# Patient Record
Sex: FEMALE | Race: WHITE | NOT HISPANIC OR LATINO
[De-identification: names, ages, dates, MRNs, and addresses within clinical notes are randomized per-mention and may not be internally consistent; named-entity substitution may affect disease eponyms.]

---

## 2017-01-18 ENCOUNTER — APPOINTMENT (OUTPATIENT)
Dept: OBGYN | Facility: CLINIC | Age: 67
End: 2017-01-18

## 2017-01-18 VITALS
WEIGHT: 131 LBS | SYSTOLIC BLOOD PRESSURE: 112 MMHG | DIASTOLIC BLOOD PRESSURE: 68 MMHG | HEIGHT: 63 IN | BODY MASS INDEX: 23.21 KG/M2

## 2017-01-18 DIAGNOSIS — Z80.8 FAMILY HISTORY OF MALIGNANT NEOPLASM OF OTHER ORGANS OR SYSTEMS: ICD-10-CM

## 2017-01-18 LAB
BILIRUB UR QL STRIP: NORMAL
COLLECTION METHOD: NORMAL
GLUCOSE UR-MCNC: NORMAL
HCG UR QL: 0.2 EU/DL
HGB UR QL STRIP.AUTO: NORMAL
KETONES UR-MCNC: NORMAL
LEUKOCYTE ESTERASE UR QL STRIP: NORMAL
NITRITE UR QL STRIP: NORMAL
PH UR STRIP: 6
PROT UR STRIP-MCNC: NORMAL
SP GR UR STRIP: 1.02

## 2017-02-02 ENCOUNTER — RX RENEWAL (OUTPATIENT)
Age: 67
End: 2017-02-02

## 2017-02-02 RX ORDER — PAROXETINE 7.5 MG/1
7.5 CAPSULE ORAL
Qty: 90 | Refills: 3 | Status: DISCONTINUED | COMMUNITY
Start: 2017-01-18 | End: 2017-02-02

## 2017-04-27 ENCOUNTER — APPOINTMENT (OUTPATIENT)
Dept: OBGYN | Facility: CLINIC | Age: 67
End: 2017-04-27

## 2017-04-27 VITALS
DIASTOLIC BLOOD PRESSURE: 72 MMHG | HEIGHT: 63 IN | WEIGHT: 137 LBS | BODY MASS INDEX: 24.27 KG/M2 | SYSTOLIC BLOOD PRESSURE: 121 MMHG

## 2017-04-27 DIAGNOSIS — F41.1 GENERALIZED ANXIETY DISORDER: ICD-10-CM

## 2017-04-27 DIAGNOSIS — G47.00 INSOMNIA, UNSPECIFIED: ICD-10-CM

## 2017-04-27 LAB
BILIRUB UR QL STRIP: NORMAL
COLLECTION METHOD: NORMAL
GLUCOSE UR-MCNC: NORMAL
HCG UR QL: 1 EU/DL
HGB UR QL STRIP.AUTO: NORMAL
KETONES UR-MCNC: ABNORMAL
LEUKOCYTE ESTERASE UR QL STRIP: NORMAL
NITRITE UR QL STRIP: NORMAL
PH UR STRIP: 7
PROT UR STRIP-MCNC: ABNORMAL
SP GR UR STRIP: 1.02

## 2017-04-27 RX ORDER — MELATONIN 3 MG
TABLET ORAL
Refills: 0 | Status: ACTIVE | COMMUNITY

## 2017-04-27 RX ORDER — PAROXETINE HYDROCHLORIDE 10 MG/1
10 TABLET, FILM COATED ORAL DAILY
Qty: 90 | Refills: 0 | Status: DISCONTINUED | COMMUNITY
Start: 2017-02-02 | End: 2017-04-27

## 2017-07-10 ENCOUNTER — OTHER (OUTPATIENT)
Age: 67
End: 2017-07-10

## 2017-07-10 ENCOUNTER — RX RENEWAL (OUTPATIENT)
Age: 67
End: 2017-07-10

## 2017-12-13 ENCOUNTER — APPOINTMENT (OUTPATIENT)
Dept: OBGYN | Facility: CLINIC | Age: 67
End: 2017-12-13
Payer: MEDICARE

## 2017-12-13 VITALS
HEIGHT: 63 IN | DIASTOLIC BLOOD PRESSURE: 80 MMHG | WEIGHT: 133.03 LBS | SYSTOLIC BLOOD PRESSURE: 122 MMHG | BODY MASS INDEX: 23.57 KG/M2

## 2017-12-13 DIAGNOSIS — R23.2 FLUSHING: ICD-10-CM

## 2017-12-13 LAB
BILIRUB UR QL STRIP: NORMAL
COLLECTION METHOD: NORMAL
GLUCOSE UR-MCNC: NORMAL
HCG UR QL: 0.2 EU/DL
HGB UR QL STRIP.AUTO: NORMAL
KETONES UR-MCNC: NORMAL
LEUKOCYTE ESTERASE UR QL STRIP: NORMAL
NITRITE UR QL STRIP: NORMAL
PH UR STRIP: 6.5
PROT UR STRIP-MCNC: NORMAL
SP GR UR STRIP: 1.02

## 2017-12-13 PROCEDURE — 99213 OFFICE O/P EST LOW 20 MIN: CPT

## 2017-12-13 PROCEDURE — 81003 URINALYSIS AUTO W/O SCOPE: CPT | Mod: QW

## 2018-02-27 ENCOUNTER — APPOINTMENT (OUTPATIENT)
Dept: OBGYN | Facility: CLINIC | Age: 68
End: 2018-02-27
Payer: MEDICARE

## 2018-02-27 VITALS
BODY MASS INDEX: 23.39 KG/M2 | SYSTOLIC BLOOD PRESSURE: 126 MMHG | WEIGHT: 132.03 LBS | DIASTOLIC BLOOD PRESSURE: 78 MMHG | HEIGHT: 63 IN

## 2018-02-27 PROCEDURE — 99214 OFFICE O/P EST MOD 30 MIN: CPT

## 2018-02-27 RX ORDER — PAROXETINE 7.5 MG/1
7.5 CAPSULE ORAL
Qty: 90 | Refills: 3 | Status: DISCONTINUED | COMMUNITY
Start: 2017-12-13 | End: 2018-02-27

## 2018-06-14 ENCOUNTER — APPOINTMENT (OUTPATIENT)
Dept: OBGYN | Facility: CLINIC | Age: 68
End: 2018-06-14
Payer: MEDICARE

## 2018-06-14 PROCEDURE — ZZZZZ: CPT

## 2018-06-15 LAB
APPEARANCE: CLEAR
BACTERIA: NEGATIVE
BILIRUBIN URINE: NEGATIVE
BLOOD URINE: NEGATIVE
COLOR: YELLOW
GLUCOSE QUALITATIVE U: NEGATIVE MG/DL
HYALINE CASTS: 3 /LPF
KETONES URINE: ABNORMAL
LEUKOCYTE ESTERASE URINE: ABNORMAL
MICROSCOPIC-UA: NORMAL
NITRITE URINE: NEGATIVE
PH URINE: 6.5
PROTEIN URINE: NEGATIVE MG/DL
RED BLOOD CELLS URINE: 11 /HPF
SPECIFIC GRAVITY URINE: 1.02
SQUAMOUS EPITHELIAL CELLS: 1 /HPF
UROBILINOGEN URINE: NEGATIVE MG/DL
WHITE BLOOD CELLS URINE: 43 /HPF

## 2018-06-18 ENCOUNTER — APPOINTMENT (OUTPATIENT)
Dept: OBGYN | Facility: CLINIC | Age: 68
End: 2018-06-18
Payer: MEDICARE

## 2018-06-18 ENCOUNTER — RESULT REVIEW (OUTPATIENT)
Age: 68
End: 2018-06-18

## 2018-06-18 ENCOUNTER — ASOB RESULT (OUTPATIENT)
Age: 68
End: 2018-06-18

## 2018-06-18 PROCEDURE — 76857 US EXAM PELVIC LIMITED: CPT

## 2018-06-18 PROCEDURE — 76830 TRANSVAGINAL US NON-OB: CPT

## 2018-06-19 ENCOUNTER — APPOINTMENT (OUTPATIENT)
Dept: OBGYN | Facility: CLINIC | Age: 68
End: 2018-06-19
Payer: MEDICARE

## 2018-06-19 PROCEDURE — ZZZZZ: CPT

## 2018-06-21 ENCOUNTER — MESSAGE (OUTPATIENT)
Age: 68
End: 2018-06-21

## 2018-06-21 LAB
BACTERIA UR CULT: NORMAL
CANCER AG125 SERPL-ACNC: 24 U/ML

## 2018-06-28 ENCOUNTER — APPOINTMENT (OUTPATIENT)
Dept: OBGYN | Facility: CLINIC | Age: 68
End: 2018-06-28
Payer: MEDICARE

## 2018-06-28 VITALS
WEIGHT: 132 LBS | HEIGHT: 63 IN | BODY MASS INDEX: 23.39 KG/M2 | SYSTOLIC BLOOD PRESSURE: 126 MMHG | DIASTOLIC BLOOD PRESSURE: 80 MMHG

## 2018-06-28 PROCEDURE — 99214 OFFICE O/P EST MOD 30 MIN: CPT | Mod: 25

## 2018-06-28 PROCEDURE — 58100 BIOPSY OF UTERUS LINING: CPT

## 2018-07-03 ENCOUNTER — RESULT REVIEW (OUTPATIENT)
Age: 68
End: 2018-07-03

## 2018-07-03 LAB — CORE LAB BIOPSY: NORMAL

## 2019-03-26 ENCOUNTER — APPOINTMENT (OUTPATIENT)
Dept: OBGYN | Facility: CLINIC | Age: 69
End: 2019-03-26
Payer: MEDICARE

## 2019-03-26 VITALS
DIASTOLIC BLOOD PRESSURE: 78 MMHG | BODY MASS INDEX: 22.68 KG/M2 | WEIGHT: 128 LBS | HEIGHT: 63 IN | SYSTOLIC BLOOD PRESSURE: 120 MMHG

## 2019-03-26 DIAGNOSIS — N95.1 MENOPAUSAL AND FEMALE CLIMACTERIC STATES: ICD-10-CM

## 2019-03-26 DIAGNOSIS — Z01.419 ENCOUNTER FOR GYNECOLOGICAL EXAMINATION (GENERAL) (ROUTINE) W/OUT ABNORMAL FINDINGS: ICD-10-CM

## 2019-03-26 LAB
BILIRUB UR QL STRIP: NORMAL
CLARITY UR: CLEAR
COLLECTION METHOD: NORMAL
GLUCOSE UR-MCNC: NORMAL
HCG UR QL: 0.2 EU/DL
HEMOCCULT SP1 STL QL: NEGATIVE
HGB UR QL STRIP.AUTO: NORMAL
KETONES UR-MCNC: NORMAL
LEUKOCYTE ESTERASE UR QL STRIP: NORMAL
NITRITE UR QL STRIP: NORMAL
PH UR STRIP: 7
PROT UR STRIP-MCNC: NORMAL
SP GR UR STRIP: 1.02

## 2019-03-26 PROCEDURE — 82270 OCCULT BLOOD FECES: CPT

## 2019-03-26 PROCEDURE — 81003 URINALYSIS AUTO W/O SCOPE: CPT | Mod: QW

## 2019-03-26 PROCEDURE — G0101: CPT

## 2019-03-26 RX ORDER — NITROFURANTOIN (MONOHYDRATE/MACROCRYSTALS) 25; 75 MG/1; MG/1
100 CAPSULE ORAL TWICE DAILY
Qty: 14 | Refills: 0 | Status: DISCONTINUED | COMMUNITY
Start: 2018-06-15 | End: 2019-03-26

## 2019-03-26 RX ORDER — CYCLOSPORINE 0.5 MG/ML
0.05 EMULSION OPHTHALMIC
Refills: 0 | Status: ACTIVE | COMMUNITY

## 2019-03-26 RX ORDER — PREDNISONE 50 MG/1
TABLET ORAL
Refills: 0 | Status: DISCONTINUED | COMMUNITY
End: 2019-03-26

## 2019-03-26 NOTE — HISTORY OF PRESENT ILLNESS
[1 Year Ago] : 1 year ago [Fair] : being in fair health [Healthy Diet] : a healthy diet [Weight Concerns] : weight concens [Last Mammogram ___] : Last Mammogram was [unfilled] [Last Bone Density ___] : Last bone density studies [unfilled] [Last Pap ___] : Last cervical pap smear was [unfilled] [Postmenopausal] : is postmenopausal [Hot Flashes] : hot flashes [Night Sweats] : night sweats [Definite:  ___ (Date)] : the last menstrual period was [unfilled] [Currently In Menopause] : currently in menopause [Experiencing Menopausal Sxs] : experiencing menopausal symptoms [Sexually Active] : is sexually active [Monogamous] : is monogamous [Male ___] : [unfilled] male [Last Colonoscopy ___] : Last colonoscopy [unfilled] [Regular Exercise] : not exercising regularly [Dyspareunia] : no dyspareunia [FreeTextEntry8] : STILL USING VAGINAL ESTROGEN AND VAGIFEM, WITH GOOD RESULTS

## 2019-03-26 NOTE — REVIEW OF SYSTEMS
[Feeling Tired] : feeling tired [Sleep Disturbances] : sleep disturbances [Anxiety] : anxiety [Hot Flashes] : hot flashes

## 2019-03-26 NOTE — PHYSICAL EXAM
[Awake] : awake [Alert] : alert [Soft] : soft [Soft, Nontender] : the abdomen was soft and nontender [No Mass] : no masses were palpated [None] : no CVA tenderness [Oriented x3] : oriented to person, place, and time [Vulvar Atrophy] : vulvar atrophy [Labia Majora] : labia major [Atrophy] : atrophy [Nl Sphincter Tone] : normal sphincter tone [External Hemorrhoid] : an external hemorrhoid [Normal] : uterus [Adnexa Absent] : was absent on the left [Acute Distress] : no acute distress [Mass] : no breast mass [Nipple Discharge] : no nipple discharge [Axillary LAD] : no axillary lymphadenopathy [Tender] : non tender [Distended] : not distended [H/Smegaly] : no hepatosplenomegaly [Depressed Mood] : not depressed [Flat Affect] : affect not flat [Occult Blood] : occult blood test from digital rectal exam was negative [FreeTextEntry7] : FOLLOWING OVARIAN CYSTS

## 2019-03-28 ENCOUNTER — APPOINTMENT (OUTPATIENT)
Dept: OBGYN | Facility: CLINIC | Age: 69
End: 2019-03-28
Payer: MEDICARE

## 2019-03-28 ENCOUNTER — ASOB RESULT (OUTPATIENT)
Age: 69
End: 2019-03-28

## 2019-03-28 PROCEDURE — 76857 US EXAM PELVIC LIMITED: CPT

## 2019-03-28 PROCEDURE — 76830 TRANSVAGINAL US NON-OB: CPT

## 2019-04-01 LAB — CYTOLOGY CVX/VAG DOC THIN PREP: NORMAL

## 2019-04-03 ENCOUNTER — MEDICATION RENEWAL (OUTPATIENT)
Age: 69
End: 2019-04-03

## 2019-05-30 ENCOUNTER — APPOINTMENT (OUTPATIENT)
Dept: OBGYN | Facility: CLINIC | Age: 69
End: 2019-05-30
Payer: MEDICARE

## 2019-05-30 ENCOUNTER — ASOB RESULT (OUTPATIENT)
Age: 69
End: 2019-05-30

## 2019-05-30 ENCOUNTER — TRANSCRIPTION ENCOUNTER (OUTPATIENT)
Age: 69
End: 2019-05-30

## 2019-05-30 PROCEDURE — 76857 US EXAM PELVIC LIMITED: CPT

## 2019-05-30 PROCEDURE — 76830 TRANSVAGINAL US NON-OB: CPT

## 2019-07-15 ENCOUNTER — RESULT REVIEW (OUTPATIENT)
Age: 69
End: 2019-07-15

## 2019-09-11 ENCOUNTER — APPOINTMENT (OUTPATIENT)
Dept: OBGYN | Facility: CLINIC | Age: 69
End: 2019-09-11
Payer: MEDICARE

## 2019-09-11 VITALS
SYSTOLIC BLOOD PRESSURE: 112 MMHG | HEIGHT: 63 IN | DIASTOLIC BLOOD PRESSURE: 78 MMHG | BODY MASS INDEX: 23.08 KG/M2 | WEIGHT: 130.25 LBS

## 2019-09-11 DIAGNOSIS — D28.0 BENIGN NEOPLASM OF VULVA: ICD-10-CM

## 2019-09-11 LAB
BILIRUB UR QL STRIP: ABNORMAL
CLARITY UR: CLEAR
COLLECTION METHOD: NORMAL
GLUCOSE UR-MCNC: NORMAL
HCG UR QL: 0.2 EU/DL
HGB UR QL STRIP.AUTO: NORMAL
KETONES UR-MCNC: NORMAL
LEUKOCYTE ESTERASE UR QL STRIP: ABNORMAL
NITRITE UR QL STRIP: NORMAL
PH UR STRIP: 6.5
PROT UR STRIP-MCNC: NORMAL
SP GR UR STRIP: 1.02

## 2019-09-11 PROCEDURE — 81003 URINALYSIS AUTO W/O SCOPE: CPT | Mod: QW

## 2019-09-11 PROCEDURE — 99213 OFFICE O/P EST LOW 20 MIN: CPT

## 2019-09-11 RX ORDER — PAROXETINE HYDROCHLORIDE 10 MG/1
10 TABLET, FILM COATED ORAL
Qty: 90 | Refills: 3 | Status: DISCONTINUED | COMMUNITY
Start: 2017-07-10 | End: 2019-09-11

## 2019-09-11 RX ORDER — RANITIDINE 300 MG/1
300 TABLET ORAL
Refills: 0 | Status: DISCONTINUED | COMMUNITY
End: 2019-09-11

## 2019-09-11 NOTE — CHIEF COMPLAINT
[Urgent Visit] : Urgent Visit [FreeTextEntry1] : APPROXIMATELY 1 MONTH AGO NOTICED LUMP AT [facility], VISIBLE WHEN RETRACTING LABIA.  NOT PAINFUL, NO PAIN WITH INTERCOURSE.  NO CHANGE IN VAGINAL DISCHARGE OR URINARY COMPLAINTS.\par \par GOING AWAY TO [facility] TO [place].

## 2019-09-11 NOTE — PHYSICAL EXAM
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

## 2019-10-28 ENCOUNTER — RESULT REVIEW (OUTPATIENT)
Age: 69
End: 2019-10-28

## 2019-12-09 ENCOUNTER — RESULT REVIEW (OUTPATIENT)
Age: 69
End: 2019-12-09

## 2020-01-08 ENCOUNTER — APPOINTMENT (OUTPATIENT)
Dept: OBGYN | Facility: CLINIC | Age: 70
End: 2020-01-08
Payer: MEDICARE

## 2020-01-08 VITALS
HEIGHT: 63 IN | BODY MASS INDEX: 23.04 KG/M2 | WEIGHT: 130 LBS | DIASTOLIC BLOOD PRESSURE: 70 MMHG | SYSTOLIC BLOOD PRESSURE: 112 MMHG

## 2020-01-08 DIAGNOSIS — K25.3 ACUTE GASTRIC ULCER W/OUT HEMORRHAGE OR PERFORATION: ICD-10-CM

## 2020-01-08 DIAGNOSIS — R35.0 FREQUENCY OF MICTURITION: ICD-10-CM

## 2020-01-08 DIAGNOSIS — N39.0 URINARY TRACT INFECTION, SITE NOT SPECIFIED: ICD-10-CM

## 2020-01-08 LAB
BILIRUB UR QL STRIP: NORMAL
COLLECTION METHOD: NORMAL
GLUCOSE UR-MCNC: NORMAL
HCG UR QL: 0.2 EU/DL
HGB UR QL STRIP.AUTO: ABNORMAL
KETONES UR-MCNC: NORMAL
LEUKOCYTE ESTERASE UR QL STRIP: ABNORMAL
NITRITE UR QL STRIP: NORMAL
PH UR STRIP: 5.5
PROT UR STRIP-MCNC: ABNORMAL
SP GR UR STRIP: 1.03

## 2020-01-08 PROCEDURE — 99213 OFFICE O/P EST LOW 20 MIN: CPT

## 2020-01-08 PROCEDURE — 81003 URINALYSIS AUTO W/O SCOPE: CPT | Mod: QW

## 2020-01-08 RX ORDER — BENZONATATE 100 MG/1
100 CAPSULE ORAL
Qty: 30 | Refills: 0 | Status: DISCONTINUED | COMMUNITY
Start: 2019-10-01

## 2020-01-08 RX ORDER — AZITHROMYCIN 250 MG/1
250 TABLET, FILM COATED ORAL
Qty: 6 | Refills: 0 | Status: DISCONTINUED | COMMUNITY
Start: 2019-10-05

## 2020-01-08 RX ORDER — ZALEPLON 10 MG/1
10 CAPSULE ORAL
Qty: 2 | Refills: 0 | Status: DISCONTINUED | COMMUNITY
Start: 2019-09-13

## 2020-01-08 RX ORDER — FLUTICASONE PROPIONATE 50 UG/1
50 SPRAY, METERED NASAL
Qty: 16 | Refills: 0 | Status: DISCONTINUED | COMMUNITY
Start: 2019-10-01

## 2020-01-08 NOTE — CHIEF COMPLAINT
[Urgent Visit] : Urgent Visit [FreeTextEntry1] : JUST BACK FROM 2 WEEK VACATION C/O SOME BLOOD WHEN WIPING, WITH URINATION, CLOUDY URINE, URINARY URGENCY.  SOME VAGINAL "IRRITATION" WITH POSSIBLE INCREASE IN WHITE, CLUMPY DISCHARGE.  "FLAKES" OF BLOOD LIKE HOT PEPPER FLAKES.  SMALL  BACK ACHE.  NO FEVER, NO CHANGE IN APPETITE OR GI COMPLAINTS.  HASN'T BEEN USING VAGIFEM PAST 2 WEEKS.\par \par LAST ENDOMETRIAL BIOPSY 6/2018, ATROPHIC. \par \par FATHER IN HOSPITAL NOW.

## 2020-01-08 NOTE — PHYSICAL EXAM
[Vulvar Atrophy] : vulvar atrophy [Normal] : uterus [Discharge] : a  ~M vaginal discharge was present [White] : white [Scant] : scant [Foul Smelling] : not foul smelling [Uterine Adnexae] : were not tender and not enlarged [Thin] : thin [No Bleeding] : there was no active vaginal bleeding [FreeTextEntry5] : NO BLOOD NOTED [de-identified] : NO CVAT OR ABDOMINAL TENDERNESS, GUARDING OR REBOUND

## 2020-01-13 LAB
APPEARANCE: ABNORMAL
BACTERIA UR CULT: ABNORMAL
BACTERIA: ABNORMAL
BILIRUBIN URINE: NEGATIVE
BLOOD URINE: ABNORMAL
COLOR: ABNORMAL
GLUCOSE QUALITATIVE U: NEGATIVE
KETONES URINE: NEGATIVE
LEUKOCYTE ESTERASE URINE: ABNORMAL
MICROSCOPIC-UA: NORMAL
NITRITE URINE: NEGATIVE
PH URINE: 8.5
PROTEIN URINE: ABNORMAL
RED BLOOD CELLS URINE: 95 /HPF
SPECIFIC GRAVITY URINE: 1.01
SQUAMOUS EPITHELIAL CELLS: 0 /HPF
TRIPLE PHOSPHATE CRYSTALS: ABNORMAL
UROBILINOGEN URINE: NORMAL
WHITE BLOOD CELLS URINE: 18 /HPF

## 2020-01-27 ENCOUNTER — OTHER (OUTPATIENT)
Age: 70
End: 2020-01-27

## 2020-01-27 ENCOUNTER — APPOINTMENT (OUTPATIENT)
Dept: OBGYN | Facility: CLINIC | Age: 70
End: 2020-01-27
Payer: MEDICARE

## 2020-01-27 PROCEDURE — ZZZZZ: CPT

## 2020-01-28 LAB
APPEARANCE: CLEAR
BACTERIA: NEGATIVE
BILIRUBIN URINE: NEGATIVE
BLOOD URINE: NEGATIVE
COLOR: NORMAL
GLUCOSE QUALITATIVE U: NEGATIVE
HYALINE CASTS: 1 /LPF
KETONES URINE: NEGATIVE
LEUKOCYTE ESTERASE URINE: NEGATIVE
MICROSCOPIC-UA: NORMAL
NITRITE URINE: NEGATIVE
PH URINE: 7
PROTEIN URINE: NEGATIVE
RED BLOOD CELLS URINE: 2 /HPF
SPECIFIC GRAVITY URINE: 1.02
SQUAMOUS EPITHELIAL CELLS: 1 /HPF
UROBILINOGEN URINE: NORMAL
WHITE BLOOD CELLS URINE: 0 /HPF

## 2020-01-29 LAB — BACTERIA UR CULT: NORMAL

## 2020-02-28 ENCOUNTER — APPOINTMENT (OUTPATIENT)
Dept: OBGYN | Facility: CLINIC | Age: 70
End: 2020-02-28

## 2020-02-28 VITALS
WEIGHT: 130 LBS | SYSTOLIC BLOOD PRESSURE: 124 MMHG | BODY MASS INDEX: 22.2 KG/M2 | HEIGHT: 64 IN | DIASTOLIC BLOOD PRESSURE: 72 MMHG

## 2020-02-28 DIAGNOSIS — R30.0 DYSURIA: ICD-10-CM

## 2020-02-28 LAB
BILIRUB UR QL STRIP: NORMAL
COLLECTION METHOD: NORMAL
GLUCOSE UR-MCNC: NORMAL
HCG UR QL: 0.2 EU/DL
HGB UR QL STRIP.AUTO: NORMAL
KETONES UR-MCNC: NORMAL
LEUKOCYTE ESTERASE UR QL STRIP: ABNORMAL
NITRITE UR QL STRIP: NORMAL
PH UR STRIP: 7
PROT UR STRIP-MCNC: NORMAL
SP GR UR STRIP: 1.02

## 2020-02-28 NOTE — HISTORY OF PRESENT ILLNESS
[___ Month(s) Ago] : [unfilled] month(s) ago [Last Pap ___] : Last cervical pap smear was [unfilled] [Last Mammogram ___] : Last Mammogram was [unfilled]

## 2020-03-02 LAB — BACTERIA UR CULT: ABNORMAL

## 2020-03-20 ENCOUNTER — NON-APPOINTMENT (OUTPATIENT)
Age: 70
End: 2020-03-20

## 2020-03-30 ENCOUNTER — APPOINTMENT (OUTPATIENT)
Dept: OBGYN | Facility: CLINIC | Age: 70
End: 2020-03-30

## 2020-03-30 ENCOUNTER — APPOINTMENT (OUTPATIENT)
Dept: OBGYN | Facility: CLINIC | Age: 70
End: 2020-03-30
Payer: MEDICARE

## 2020-03-30 VITALS — HEIGHT: 64 IN | SYSTOLIC BLOOD PRESSURE: 110 MMHG | DIASTOLIC BLOOD PRESSURE: 64 MMHG

## 2020-03-30 DIAGNOSIS — N60.11 DIFFUSE CYSTIC MASTOPATHY OF RIGHT BREAST: ICD-10-CM

## 2020-03-30 DIAGNOSIS — N60.12 DIFFUSE CYSTIC MASTOPATHY OF RIGHT BREAST: ICD-10-CM

## 2020-03-30 LAB
BILIRUB UR QL STRIP: NORMAL
CLARITY UR: CLEAR
COLLECTION METHOD: NORMAL
GLUCOSE UR-MCNC: NORMAL
HCG UR QL: 0.2 EU/DL
HGB UR QL STRIP.AUTO: NORMAL
KETONES UR-MCNC: NORMAL
LEUKOCYTE ESTERASE UR QL STRIP: ABNORMAL
NITRITE UR QL STRIP: NORMAL
PH UR STRIP: 7
PROT UR STRIP-MCNC: NORMAL
SP GR UR STRIP: 1.02

## 2020-03-30 PROCEDURE — 81003 URINALYSIS AUTO W/O SCOPE: CPT | Mod: QW

## 2020-03-30 PROCEDURE — 99214 OFFICE O/P EST MOD 30 MIN: CPT

## 2020-03-30 RX ORDER — CONJUGATED ESTROGENS 0.62 MG/G
0.62 CREAM VAGINAL
Qty: 1 | Refills: 3 | Status: COMPLETED | OUTPATIENT
Start: 2017-12-13 | End: 2020-03-30

## 2020-03-30 RX ORDER — TERCONAZOLE 8 MG/G
0.8 CREAM VAGINAL
Qty: 1 | Refills: 1 | Status: COMPLETED | COMMUNITY
Start: 2019-04-03 | End: 2020-03-30

## 2020-03-30 RX ORDER — ESTRADIOL 10 UG/1
10 INSERT VAGINAL
Qty: 36 | Refills: 3 | Status: COMPLETED | COMMUNITY
Start: 2017-12-13 | End: 2020-03-30

## 2020-03-30 RX ORDER — CONJUGATED ESTROGENS 0.62 MG/G
0.62 CREAM VAGINAL
Qty: 8 | Refills: 3 | Status: COMPLETED | OUTPATIENT
Start: 2019-03-26 | End: 2020-03-30

## 2020-03-30 RX ORDER — ESTRADIOL 10 UG/1
10 INSERT VAGINAL
Qty: 24 | Refills: 0 | Status: COMPLETED | OUTPATIENT
Start: 2017-01-18 | End: 2020-03-30

## 2020-03-30 NOTE — REVIEW OF SYSTEMS
[Bloating] : bloating [Frequency] : frequency [Urgency] : urgency [Arthralgias] : arthralgias [Joint Pain] : joint pain [Hot Flashes] : hot flashes [Muscle Weakness] : muscle weakness [Nl] : Integumentary [Dysuria] : no dysuria

## 2020-03-30 NOTE — PHYSICAL EXAM
[Awake] : awake [Alert] : alert [Axillary LAD] : axillary lymphadenopathy [Examination Of The Breasts] : a normal appearance [Axillary Lymph Nodes Enlarged On The Right] : enlarged node on the right [Acute Distress] : no acute distress [Mass] : no breast mass [Tender] : no tenderness [Nipple Discharge] : no nipple discharge [Axillary Lymph Nodes Enlarged On The Left] : no enlarged nodes on the left [Normal] : uterus [Labia Majora] : labia major [Labia Minora] : labia minora [Uterine Adnexae] : was normal on the left [Adnexa Absent] : was absent on the left [FreeTextEntry4] : appears well setrogenized

## 2020-03-30 NOTE — COUNSELING
[FreeTextEntry2] : Discussed with pt bladder irritants, and suggested she increase water, continue cranberry tabs,  decrease coffee to 10 ounces daily.  Also suggested she d/c any acidy food, any bladder irritants.  If culture neg, then fu with ruo for eval of urgency and frequency.  If positive pt must note if rx with antibiotics alter symptoms of urgency and frequency

## 2020-03-31 LAB
APPEARANCE: CLEAR
BILIRUBIN URINE: NEGATIVE
BLOOD URINE: NEGATIVE
COLOR: NORMAL
GLUCOSE QUALITATIVE U: NEGATIVE
KETONES URINE: NEGATIVE
LEUKOCYTE ESTERASE URINE: NEGATIVE
NITRITE URINE: NEGATIVE
PH URINE: 7
PROTEIN URINE: NEGATIVE
SPECIFIC GRAVITY URINE: 1.01
UROBILINOGEN URINE: NORMAL

## 2020-04-01 LAB
CANDIDA VAG CYTO: NOT DETECTED
G VAGINALIS+PREV SP MTYP VAG QL MICRO: NOT DETECTED
T VAGINALIS VAG QL WET PREP: NOT DETECTED

## 2020-04-13 ENCOUNTER — RX RENEWAL (OUTPATIENT)
Age: 70
End: 2020-04-13

## 2020-05-15 ENCOUNTER — RX RENEWAL (OUTPATIENT)
Age: 70
End: 2020-05-15

## 2020-05-15 ENCOUNTER — APPOINTMENT (OUTPATIENT)
Dept: OBGYN | Facility: CLINIC | Age: 70
End: 2020-05-15
Payer: MEDICARE

## 2020-05-15 VITALS
HEIGHT: 64 IN | WEIGHT: 130 LBS | BODY MASS INDEX: 22.2 KG/M2 | SYSTOLIC BLOOD PRESSURE: 110 MMHG | DIASTOLIC BLOOD PRESSURE: 68 MMHG

## 2020-05-15 DIAGNOSIS — N93.9 ABNORMAL UTERINE AND VAGINAL BLEEDING, UNSPECIFIED: ICD-10-CM

## 2020-05-15 DIAGNOSIS — Z76.89 PERSONS ENCOUNTERING HEALTH SERVICES IN OTHER SPECIFIED CIRCUMSTANCES: ICD-10-CM

## 2020-05-15 PROCEDURE — 99213 OFFICE O/P EST LOW 20 MIN: CPT | Mod: 25

## 2020-05-15 PROCEDURE — 58100 BIOPSY OF UTERUS LINING: CPT

## 2020-05-15 PROCEDURE — 36415 COLL VENOUS BLD VENIPUNCTURE: CPT

## 2020-05-15 RX ORDER — SUCRALFATE 1 G/1
1 TABLET ORAL
Qty: 240 | Refills: 0 | Status: COMPLETED | COMMUNITY
Start: 2019-12-09 | End: 2020-05-15

## 2020-05-15 RX ORDER — SULFAMETHOXAZOLE AND TRIMETHOPRIM 800; 160 MG/1; MG/1
800-160 TABLET ORAL TWICE DAILY
Qty: 10 | Refills: 0 | Status: COMPLETED | COMMUNITY
Start: 2020-01-08 | End: 2020-05-15

## 2020-05-15 NOTE — PROCEDURE
[Endometrial Biopsy] : Endometrial biopsy [Risks] : risks [Post-Menop. Bleeding] : post-menopausal bleeding [Benefits] : benefits [Patient] : patient [Alternatives] : alternatives [Infection] : infection [Bleeding] : bleeding [Uterine Perforation] : uterine perforation [Pain] : pain [Allergic Reaction] : allergic reaction [None] : none [CONSENT OBTAINED] : written consent was obtained prior to the procedure. [Required Dilation] : required dilation [Sounded to ___ cm] : sounded to [unfilled] ~Ucm [Pipelle] : a Pipelle endometrial suction curette [Scant] : a scant [Tolerated Well] : the patient tolerated the procedure well [de-identified] : tylenol [de-identified] : May be endocervical only, to check pathology  Pt aware may need to be redone or sono to check ET

## 2020-05-15 NOTE — ASSESSMENT
[FreeTextEntry1] : Cervical os was tight\par Pipelle inserted to about 4.5 cm, scant amount of material obtained\par Advised pt may be only endocervical tissue and may have to be redone, or repeat sono for ET which was not done on sono of 5/13/20\par Sono 5/13/20: multiple fibroids, calcified\par ET not visulaized\par Right adnexa cystic lesion septated 6.6x3.9x5.6\par \par This liesion is stable\par Will repeat ALEXIA for safety.

## 2020-05-18 LAB
CA 125 (LABCORP): 19.5 U/ML
HE 4: 74.5 PMOL/L
POSTMENOPAUSAL ROMA: 1.93
PREMENOPAUSAL ROMA: 1.74
ROMA COMMENT: NORMAL

## 2020-05-21 LAB — CORE LAB BIOPSY: NORMAL

## 2020-07-14 ENCOUNTER — APPOINTMENT (OUTPATIENT)
Dept: ULTRASOUND IMAGING | Facility: CLINIC | Age: 70
End: 2020-07-14
Payer: MEDICARE

## 2020-07-14 ENCOUNTER — OUTPATIENT (OUTPATIENT)
Dept: OUTPATIENT SERVICES | Facility: HOSPITAL | Age: 70
LOS: 1 days | End: 2020-07-14
Payer: MEDICARE

## 2020-07-14 ENCOUNTER — RESULT REVIEW (OUTPATIENT)
Age: 70
End: 2020-07-14

## 2020-07-14 DIAGNOSIS — N95.0 POSTMENOPAUSAL BLEEDING: ICD-10-CM

## 2020-07-14 PROCEDURE — 58340 CATHETER FOR HYSTEROGRAPHY: CPT

## 2020-07-14 PROCEDURE — 76831 ECHO EXAM UTERUS: CPT

## 2020-07-14 PROCEDURE — 76831 ECHO EXAM UTERUS: CPT | Mod: 26

## 2020-07-22 ENCOUNTER — APPOINTMENT (OUTPATIENT)
Dept: OBGYN | Facility: CLINIC | Age: 70
End: 2020-07-22
Payer: MEDICARE

## 2020-07-22 VITALS
WEIGHT: 130 LBS | HEIGHT: 64 IN | BODY MASS INDEX: 22.2 KG/M2 | DIASTOLIC BLOOD PRESSURE: 84 MMHG | SYSTOLIC BLOOD PRESSURE: 112 MMHG

## 2020-07-22 DIAGNOSIS — R22.31 LOCALIZED SWELLING, MASS AND LUMP, RIGHT UPPER LIMB: ICD-10-CM

## 2020-07-22 PROCEDURE — 99213 OFFICE O/P EST LOW 20 MIN: CPT

## 2020-07-22 NOTE — DISCUSSION/SUMMARY
[FreeTextEntry1] : EVALUATION AND MANAGEMENT OF PERSISTENT, POSSIBLY ENLARGING SEPTATED OVARIAN CYST.\par REFERRAL FOR SURGICAL EVALUATION OF CYST.\par RIGHT AXILLARY NODULE NOT PALPABLE, NORMAL MAMMO/SONO.\par

## 2020-07-22 NOTE — PHYSICAL EXAM
[Mass] : no breast mass [Tender] : no tenderness [Nipple Discharge] : no nipple discharge [Axillary LAD] : no axillary lymphadenopathy [Examination Of The Breasts] : a normal appearance [Normal] : normal [No Discharge] : no discharge [No Masses] : no breast masses were palpable [de-identified] : DEFERRED

## 2020-07-24 ENCOUNTER — APPOINTMENT (OUTPATIENT)
Dept: GYNECOLOGIC ONCOLOGY | Facility: CLINIC | Age: 70
End: 2020-07-24
Payer: MEDICARE

## 2020-07-24 PROCEDURE — 76857 US EXAM PELVIC LIMITED: CPT | Mod: 59

## 2020-07-24 PROCEDURE — 99205 OFFICE O/P NEW HI 60 MIN: CPT | Mod: 25

## 2020-07-24 PROCEDURE — 76830 TRANSVAGINAL US NON-OB: CPT | Mod: 59

## 2020-07-27 NOTE — ASSESSMENT
[FreeTextEntry1] : 68yo with a growing ovarian cyst, PMB and fibroids with inability to perform endometrial biopsy (ECC -benign). Normal ALEXIA. Pelvic US today revealed a thin lining, right adnexal cyst with septation measuring 7.2x 6.1 x 4.7cm. MRI pelvis recommended for further evaluation. Discussed my low suspicion for malignancy.

## 2020-07-27 NOTE — HISTORY OF PRESENT ILLNESS
[FreeTextEntry1] : This 70yo ,  x 1, LMP at 49, referred by Dr. Carroll for evaluation of a growing right ovarian cyst. She reports having this cyst for 5 years and has been monitored all this time, however, recently, the cyst has enlarged and changed. Patient also had a few episodes of PMB (light staining) over the past 2 months. Complains of dyspareunia. SHG on 20 revealed a 7.1cm right adnexal cyst with thin septation and possible wall calcification. also seen were large calcified fibroids,endometrium not well demonstrated due to mass effect of fibroid, no evidence of polyp or thickening. Endometrial biopsy attempted on 5/15/20 but only endocervical tissue obtained revealed scant benign endocervical tissue. Postmenopausal ALEXIA on 20 was wnl. Pt is on vaginal estrogen. Admits to mild bloating, denies pain, bowel or bladder issues.   \par \par Pap smear-3/2019-neg, pt denies h/o abnormal paps \par Mammo-2020- Bi Rads-2\par Colonsocopy-2019-benign polyps removed \par [id number]

## 2020-07-27 NOTE — PHYSICAL EXAM
[Abnormal] : Adnexa(ae): Abnormal [Normal] : Bimanual Exam: Normal [de-identified] : palpable right adnexal cyst [de-identified] : Patient was interviewed and examined with chaperone present. Name of chaperone: Shayla Guthrie

## 2020-07-27 NOTE — CHIEF COMPLAINT
[FreeTextEntry1] : [facility]\par \par [facility] [phone] at [address] [place] [address]\par

## 2020-08-03 ENCOUNTER — OUTPATIENT (OUTPATIENT)
Dept: OUTPATIENT SERVICES | Facility: HOSPITAL | Age: 70
LOS: 1 days | End: 2020-08-03
Payer: MEDICARE

## 2020-08-03 ENCOUNTER — APPOINTMENT (OUTPATIENT)
Dept: MRI IMAGING | Facility: CLINIC | Age: 70
End: 2020-08-03
Payer: MEDICARE

## 2020-08-03 DIAGNOSIS — N83.299 OTHER OVARIAN CYST, UNSPECIFIED SIDE: ICD-10-CM

## 2020-08-03 PROCEDURE — 72197 MRI PELVIS W/O & W/DYE: CPT

## 2020-08-03 PROCEDURE — 72197 MRI PELVIS W/O & W/DYE: CPT | Mod: 26

## 2020-08-03 PROCEDURE — A9585: CPT

## 2020-08-05 ENCOUNTER — APPOINTMENT (OUTPATIENT)
Dept: GYNECOLOGIC ONCOLOGY | Facility: CLINIC | Age: 70
End: 2020-08-05
Payer: MEDICARE

## 2020-08-05 NOTE — REASON FOR VISIT
[FreeTextEntry1] : [facility]\par \par [facility] [phone] at [address] [place] [address]\par

## 2020-08-05 NOTE — HISTORY OF PRESENT ILLNESS
[FreeTextEntry1] : 71 y/o with a growing ovarian cyst, PMB and fibroids with inability to performed EMB (ECC benign) with a normal ALEXIA score returns today to review MRI pelvis. US from 7/24/20 revealed a thin lining, right adnexal cyst with septation measuring 7.2 x 6.1 x 4.7 cm. \par \par MRI pelvis revealed-

## 2020-08-07 ENCOUNTER — APPOINTMENT (OUTPATIENT)
Dept: GYNECOLOGIC ONCOLOGY | Facility: CLINIC | Age: 70
End: 2020-08-07
Payer: MEDICARE

## 2020-08-07 PROCEDURE — 99214 OFFICE O/P EST MOD 30 MIN: CPT

## 2020-08-11 NOTE — REASON FOR VISIT
[FreeTextEntry1] : [place] Location \par \par [facility] [place]. [phone]\par [address] [place] [address]

## 2020-08-11 NOTE — END OF VISIT
[FreeTextEntry3] : Written by Lily Montelongo, acting as a scribe for Dr. Zhen Poole\par This note accurately reflects the work and decisions made by me.

## 2020-08-11 NOTE — ASSESSMENT
[FreeTextEntry1] : I discussed with patient that her MRI of the pelvis was reassuring. Patients cyst have not grown and I advised her that I do not believe that her pain is from cyst. I recommended she follow up with Dr. Carly bledsoe aware  to further discuss pain during intercourse. Otherwise patient will follow up for a follow up ultrasound in November. Patient stated she understood and agreed to comply.

## 2020-08-11 NOTE — PHYSICAL EXAM
[Normal] : No focal neurologic defects observed [de-identified] : Lily Montelongo Medical assistant chaperoned during results and discussion.

## 2020-08-11 NOTE — HISTORY OF PRESENT ILLNESS
[FreeTextEntry1] : 71 y/o with a growing ovarian cyst, PMB and fibroids with inability to performed EMB (ECC benign) with a normal ALEXIA score returns today to review MRI pelvis. US from 7/24/20 revealed a thin lining, right adnexal cyst with septation measuring 7.2 x 6.1 x 4.7 cm. \par \par MRI pelvis from 8/3/2020 revealed- Redemonstration of a 6.7 cm right adnexal cystic lesion with thin septation. No enhancing mural nodularity. Fibroid uterus.\par \par Patient denies any VB. Patient admits to pain during intercourse.

## 2020-08-19 ENCOUNTER — APPOINTMENT (OUTPATIENT)
Dept: OBGYN | Facility: CLINIC | Age: 70
End: 2020-08-19

## 2020-09-10 ENCOUNTER — APPOINTMENT (OUTPATIENT)
Dept: OBGYN | Facility: CLINIC | Age: 70
End: 2020-09-10
Payer: MEDICARE

## 2020-09-10 DIAGNOSIS — N94.10 UNSPECIFIED DYSPAREUNIA: ICD-10-CM

## 2020-09-10 PROCEDURE — 99214 OFFICE O/P EST MOD 30 MIN: CPT | Mod: 95

## 2020-09-10 NOTE — DISCUSSION/SUMMARY
[FreeTextEntry1] : DEEP DYSPARUENIA, DISCUSSED USE OF DILATORS, PHYSICAL THERAPY, REFERRALS GIVEN.\par CONSIDER CHANGE IN VAGINAL ESTRADIOL TO PRASTERONE SUPPOSITORIES.\par F/U PELVIC CYST AS SCHEDULED 11/2020, DR. LIRA.

## 2020-11-03 ENCOUNTER — RX RENEWAL (OUTPATIENT)
Age: 70
End: 2020-11-03

## 2020-11-13 ENCOUNTER — APPOINTMENT (OUTPATIENT)
Dept: GYNECOLOGIC ONCOLOGY | Facility: CLINIC | Age: 70
End: 2020-11-13
Payer: MEDICARE

## 2020-11-13 PROCEDURE — 76857 US EXAM PELVIC LIMITED: CPT | Mod: 59

## 2020-11-13 PROCEDURE — 76830 TRANSVAGINAL US NON-OB: CPT | Mod: 59

## 2020-11-13 PROCEDURE — 99214 OFFICE O/P EST MOD 30 MIN: CPT | Mod: 25

## 2020-11-13 NOTE — REASON FOR VISIT
[FreeTextEntry1] : [place] Location \par \par [facility] [place]. [phone]\par [address] [place] [address]

## 2020-11-13 NOTE — END OF VISIT
[FreeTextEntry3] : Written by Lily Montelongo, acting as a scribe for Dr. Zhen Poole \par This note accurately reflects the work and decisions made by me.

## 2020-11-13 NOTE — PHYSICAL EXAM
[Normal] : No focal neurologic defects observed [de-identified] : Lily Montelongo Medical assistant chaperoned during results and discussion.

## 2020-11-13 NOTE — HISTORY OF PRESENT ILLNESS
[FreeTextEntry1] : This 70 year old with growing ovarian cyst, PMB and fibroids with inability to perform EMB (ECC benign) with normal ALEXIA presents to the office today for a follow up ultrasound. Patient had an MRI of the pelvis on 8/3/2020 which showed re-demonstration of a 6.7 cm right adnexal cystic lesion with thin septation. No enhancing mural nodularity. Fibroid uterus. She feels well from a gynecological stand point. She was started on Estradiol by Dr. Carroll for painful intercourse.

## 2020-11-13 NOTE — ASSESSMENT
[FreeTextEntry1] : Ultrasound performed today was stable. UT 8.0 x 4.9 x 5.3 cm. Endo 4.6 mm. Multiple calcified fibroids largest measuring 2.3 x 2.1 x 2.4. Right adnexal septated cyst 7.0 x 5.3 x 4.5 cm septation 2.0 mm. Left ovarian removed.

## 2020-12-23 PROBLEM — N39.0 ACUTE UTI: Status: RESOLVED | Noted: 2020-01-08 | Resolved: 2020-12-23

## 2021-03-01 RX ORDER — PRASTERONE 6.5 MG/1
6.5 INSERT VAGINAL
Qty: 3 | Refills: 3 | Status: DISCONTINUED | COMMUNITY
Start: 2020-09-10 | End: 2021-03-01

## 2021-03-22 DIAGNOSIS — R92.2 INCONCLUSIVE MAMMOGRAM: ICD-10-CM

## 2021-05-13 ENCOUNTER — APPOINTMENT (OUTPATIENT)
Dept: NEUROSURGERY | Facility: CLINIC | Age: 71
End: 2021-05-13
Payer: MEDICARE

## 2021-05-13 VITALS
BODY MASS INDEX: 22.36 KG/M2 | TEMPERATURE: 98.2 F | HEART RATE: 76 BPM | DIASTOLIC BLOOD PRESSURE: 73 MMHG | HEIGHT: 64 IN | OXYGEN SATURATION: 100 % | WEIGHT: 131 LBS | SYSTOLIC BLOOD PRESSURE: 123 MMHG

## 2021-05-13 VITALS — DIASTOLIC BLOOD PRESSURE: 81 MMHG | SYSTOLIC BLOOD PRESSURE: 127 MMHG

## 2021-05-13 DIAGNOSIS — I67.1 CEREBRAL ANEURYSM, NONRUPTURED: ICD-10-CM

## 2021-05-13 PROCEDURE — 99203 OFFICE O/P NEW LOW 30 MIN: CPT

## 2021-05-17 NOTE — DATA REVIEWED
[de-identified] : MRI brain ramez [de-identified] : MRa brain [facility] 3/8/2021:

## 2021-05-17 NOTE — HISTORY OF PRESENT ILLNESS
[de-identified] : Anastasiia Tracey is a 70yr old female here for a new patient visit. SHe had an episode of left eye blurry vision which lasted five minutes and resolved. Followed up with her PCP and eye doctor. MRI brain MRA head and neck ordered for further evaluation. MRA brain incidentally noted small cerebral aneurysm and she was referred here for further management. She states he opthalmology exam was normal. Today she feels well denies any motor sensory speech visual abnormalities. \par \par PMH: gastric ulcer migraines \par \par PCP: Dr. Raul Ariza

## 2021-05-17 NOTE — ASSESSMENT
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 Not at risk for subarachnoid hemorrhage \par Recommend repeat MRA brain non con in 1year May 2022 then telehealth visit after

## 2021-06-02 ENCOUNTER — APPOINTMENT (OUTPATIENT)
Dept: GYNECOLOGIC ONCOLOGY | Facility: CLINIC | Age: 71
End: 2021-06-02
Payer: MEDICARE

## 2021-06-02 PROCEDURE — 99212 OFFICE O/P EST SF 10 MIN: CPT | Mod: 25

## 2021-06-02 PROCEDURE — 93976 VASCULAR STUDY: CPT | Mod: 59

## 2021-06-02 PROCEDURE — 76857 US EXAM PELVIC LIMITED: CPT | Mod: 59

## 2021-06-02 PROCEDURE — 76830 TRANSVAGINAL US NON-OB: CPT | Mod: 59

## 2021-06-02 NOTE — ASSESSMENT
[FreeTextEntry1] : Ultrasound from today showed-  UT 7.2 x 5.9 x3.8 cm, endo 4.7 mm. Multiple calcified fibroids, calcified fibroid 3.0 x 3.3 x 3.2 cm. Calcified fibroid 1.4 x 1.7 x 1.6 cm. Right ovary septated w/ debris measuring 6.0 x 4.3 x 7.0 cm. Septation measures .3 cm. RI .77. \par \par 69 y/o w/ ovarian cyst and fibroid. I reviewed pt. US and explained that on when comparing today's scan from prior her cyst is now slightly smaller in size. In the absence of any symptoms and given decrease in size of cyst I am not recommending surgical intervention at this time. I will like to continue to followed conservatively w/ a f/u US in December.

## 2021-06-02 NOTE — REASON FOR VISIT
[FreeTextEntry1] : [place] [facility] [phone] at [address] [place] [address]\par

## 2021-06-02 NOTE — PHYSICAL EXAM
[Normal] : Description of patient's judgment and insight: Normal [de-identified] : Rosibel Dubose MA was present the entire time of results and discussion.

## 2021-06-02 NOTE — HISTORY OF PRESENT ILLNESS
[FreeTextEntry1] : 69 y/o female w/ ovarian cyst, PMB and fibroids with inability to perform EMB (ECC benign) with normal ALEXIA presents to the office today for a follow up ultrasound. Last US from 11/2020 revealed multiple calcified fibroids and a right adnexal septated cyst measuring 7 cm. Pt feels well from a gynecological stand point.

## 2021-06-02 NOTE — END OF VISIT
[FreeTextEntry3] : Written by Rose Wood, acting as a scribe for Dr. Zhen Poole.\par This note accurately reflects the work and decisions made by me.

## 2021-07-19 ENCOUNTER — RESULT REVIEW (OUTPATIENT)
Age: 71
End: 2021-07-19

## 2021-11-18 ENCOUNTER — APPOINTMENT (OUTPATIENT)
Dept: OBGYN | Facility: CLINIC | Age: 71
End: 2021-11-18
Payer: MEDICARE

## 2021-11-18 VITALS
DIASTOLIC BLOOD PRESSURE: 70 MMHG | WEIGHT: 131 LBS | BODY MASS INDEX: 23.21 KG/M2 | HEIGHT: 63 IN | SYSTOLIC BLOOD PRESSURE: 118 MMHG

## 2021-11-18 LAB
BILIRUB UR QL STRIP: NORMAL
COLLECTION METHOD: NORMAL
GLUCOSE UR-MCNC: NORMAL
HCG UR QL: 0.2 EU/DL
HGB UR QL STRIP.AUTO: ABNORMAL
KETONES UR-MCNC: NORMAL
LEUKOCYTE ESTERASE UR QL STRIP: ABNORMAL
NITRITE UR QL STRIP: NORMAL
PH UR STRIP: 5.5
PROT UR STRIP-MCNC: NORMAL
SP GR UR STRIP: 1.01

## 2021-11-18 PROCEDURE — 99213 OFFICE O/P EST LOW 20 MIN: CPT

## 2021-11-18 PROCEDURE — 81003 URINALYSIS AUTO W/O SCOPE: CPT | Mod: QW

## 2021-11-18 RX ORDER — TRIAMCINOLONE ACETONIDE 1 MG/G
0.1 CREAM TOPICAL 4 TIMES DAILY
Qty: 1 | Refills: 5 | Status: ACTIVE | COMMUNITY
Start: 2020-09-24 | End: 1900-01-01

## 2021-11-18 RX ORDER — NYSTATIN 100000 [USP'U]/G
100000 CREAM TOPICAL 4 TIMES DAILY
Qty: 1 | Refills: 5 | Status: ACTIVE | COMMUNITY
Start: 2020-09-24 | End: 1900-01-01

## 2021-11-18 NOTE — PHYSICAL EXAM
[Chaperone Present] : A chaperone was present in the examining room during all aspects of the physical examination [FreeTextEntry1] : IRIS HARTMANN.  [Normal] : uterus [Atrophy] : atrophy [No Bleeding] : there was no active vaginal bleeding [Uterine Adnexae] : were not tender and not enlarged

## 2021-11-18 NOTE — CHIEF COMPLAINT
[FreeTextEntry1] : 70 YO F \par HERE FOR VAG NO DISCHARGE, ODOR, IRRITATION, ITCHING FOR 3 DAYS.\par SEXUALLY ACTIVE:yes male\par LENGTH OF TIME IN RELATIONSHIP: 55 years exclusive \par \par MEDICAL HISTORY INCLUDES OVRIAN CYSTS MONITOR BY ONC DR GASTON\par NUTRITIONAL INFO: overall well balanced,ca rich food: 2 daily, WEIGHT BEARING Exercise \par

## 2021-11-22 LAB
BACTERIA UR CULT: NORMAL
CANDIDA VAG CYTO: NOT DETECTED
G VAGINALIS+PREV SP MTYP VAG QL MICRO: NOT DETECTED
T VAGINALIS VAG QL WET PREP: NOT DETECTED

## 2021-12-02 ENCOUNTER — APPOINTMENT (OUTPATIENT)
Dept: OBGYN | Facility: CLINIC | Age: 71
End: 2021-12-02
Payer: MEDICARE

## 2021-12-02 VITALS — DIASTOLIC BLOOD PRESSURE: 84 MMHG | SYSTOLIC BLOOD PRESSURE: 122 MMHG | HEIGHT: 63 IN

## 2021-12-02 DIAGNOSIS — N83.201 UNSPECIFIED OVARIAN CYST, RIGHT SIDE: ICD-10-CM

## 2021-12-02 DIAGNOSIS — N76.0 ACUTE VAGINITIS: ICD-10-CM

## 2021-12-02 LAB
BILIRUB UR QL STRIP: NEGATIVE
GLUCOSE UR-MCNC: NEGATIVE
HCG UR QL: 0.2 EU/DL
HGB UR QL STRIP.AUTO: NEGATIVE
KETONES UR-MCNC: ABNORMAL
LEUKOCYTE ESTERASE UR QL STRIP: NEGATIVE
NITRITE UR QL STRIP: NEGATIVE
PH UR STRIP: 6.5
PROT UR STRIP-MCNC: NEGATIVE
SP GR UR STRIP: 1.02

## 2021-12-02 PROCEDURE — G0101: CPT

## 2021-12-02 PROCEDURE — 81003 URINALYSIS AUTO W/O SCOPE: CPT | Mod: QW

## 2021-12-02 NOTE — PHYSICAL EXAM
[Appropriately responsive] : appropriately responsive [Alert] : alert [No Acute Distress] : no acute distress [Soft] : soft [Non-tender] : non-tender [Non-distended] : non-distended [No HSM] : No HSM [No Lesions] : no lesions [No Mass] : no mass [Oriented x3] : oriented x3 [Examination Of The Breasts] : a normal appearance [No Masses] : no breast masses were palpable [Vulvar Atrophy] : vulvar atrophy [Labia Majora] : normal [Labia Minora] : normal [Atrophy] : atrophy [Normal] : normal [Adnexa Tenderness On The Right] : tender  [___] : [unfilled] ~Ucm mass on the right [Uterine Adnexae] : non-palpable [No Tenderness] : no tenderness [Nl Sphincter Tone] : normal sphincter tone [FreeTextEntry9] : GUAIAC NEGATIVE

## 2021-12-02 NOTE — HISTORY OF PRESENT ILLNESS
[FreeTextEntry1] : SEEN C/O VAGINAL ODOR AND DISCHARGE.  IS S/P RX METRONIDAZOLE VAGINAL GEL WITH CULTURES NEGATIVE IN OFFICE.  ALSO C/O URINARY FREQUENCY.  URINE CULTURE NEGATIVE, AS WELL.  DISCHARGE IS YELLOW, SCANT.  \par \par HAS F/U DR. LIRA TOMORROW OVARIAN CYST.\par \par USING ESTRADIOL VAGINAL CREAM AT [facility] ONLY.  \par \par DISCUSSED PRECAUTIONS AGAINST COVID19, INCLUDING MASK WEARING, SOCIAL DISTANCING AND HAND WASHING.\par VACCINATED. [Patient reported mammogram was normal] : Patient reported mammogram was normal [Patient reported PAP Smear was normal] : Patient reported PAP Smear was normal [Patient reported bone density results were abnormal] : Patient reported bone density results were abnormal [Mammogramdate] : 4/2021 [PapSmeardate] : 2/3019 [BoneDensityDate] : 4/2017 [TextBox_37] : OSTEOPENIA [No] : Patient does not have concerns regarding sex

## 2021-12-02 NOTE — PLAN
[FreeTextEntry1] : DENNIS-CARE, VAGINAL CULTURE (ALL PATHOGENS), F/U DISCHARGE SYMPTOMS.\par \par F/U DR. LIRA OVARIAN CYST.

## 2021-12-03 ENCOUNTER — APPOINTMENT (OUTPATIENT)
Dept: GYNECOLOGIC ONCOLOGY | Facility: CLINIC | Age: 71
End: 2021-12-03
Payer: MEDICARE

## 2021-12-03 VITALS
BODY MASS INDEX: 23.21 KG/M2 | WEIGHT: 131 LBS | DIASTOLIC BLOOD PRESSURE: 73 MMHG | RESPIRATION RATE: 16 BRPM | HEART RATE: 71 BPM | SYSTOLIC BLOOD PRESSURE: 119 MMHG | OXYGEN SATURATION: 98 % | HEIGHT: 63 IN

## 2021-12-03 PROCEDURE — 99212 OFFICE O/P EST SF 10 MIN: CPT | Mod: 25

## 2021-12-03 PROCEDURE — 76830 TRANSVAGINAL US NON-OB: CPT | Mod: 59

## 2021-12-03 PROCEDURE — 76857 US EXAM PELVIC LIMITED: CPT | Mod: 59

## 2021-12-03 PROCEDURE — 93976 VASCULAR STUDY: CPT | Mod: 59

## 2021-12-06 ENCOUNTER — TRANSCRIPTION ENCOUNTER (OUTPATIENT)
Age: 71
End: 2021-12-06

## 2021-12-06 ENCOUNTER — NON-APPOINTMENT (OUTPATIENT)
Age: 71
End: 2021-12-06

## 2021-12-06 LAB — BACTERIA GENITAL AEROBE CULT: ABNORMAL

## 2021-12-13 NOTE — HISTORY OF PRESENT ILLNESS
[FreeTextEntry1] : 70 y/o female w/ ovarian cyst, PMB and fibroids with inability to perform EMB (ECC benign) with normal ALEXIA presents to the office today for a follow up ultrasound. Pt was last seen on 6/2/21 US performed in the office revealed UT 7.2 x 5.9 x3.8 cm, endo 4.7 mm. Multiple calcified fibroids, calcified fibroid 3.0 x 3.3 x 3.2 cm. Calcified fibroid 1.4 x 1.7 x 1.6 cm. Right ovary septated w/ debris measuring 6.0 x 4.3 x 7.0 cm. Septation measures.3 cm. RI.77. I discussed with patient slight decrease in size in cyst. No surgical intervention was warranted at the time and agreed to continue conservative management with follow up ultrasounds. Patient feels well from a gynecological standpoint. She denies any pain or VB. \par \par Mammogram 4/6/21 benign findings, recommendation to have breast ultrasound in 1 year.\par Dexa 4/2017 osteopenia.

## 2021-12-13 NOTE — PHYSICAL EXAM
[Normal] : Mood and affect: Normal [de-identified] : Lily Montelongo Medical assistant chaperoned during results and discussion.

## 2021-12-13 NOTE — REASON FOR VISIT
[FreeTextEntry1] : [place] Location\par \par [facility] [place]. [phone]\par [address] [place] [address]

## 2021-12-13 NOTE — ASSESSMENT
[FreeTextEntry1] : Ultrasound performed in office today revealed: See report for findings. \par \par I discussed with patient that her ultrasound today was stable. I explained that her fibroid and cyst were stable. I am recommending patient have a follow up ultrasound with her gynecologist in 6 months. Patient understands that if she develops any symptoms or concerns she may return to see me at any time. Patient stated she understood and agreed to comply.

## 2022-02-01 ENCOUNTER — NON-APPOINTMENT (OUTPATIENT)
Age: 72
End: 2022-02-01

## 2022-02-02 ENCOUNTER — APPOINTMENT (OUTPATIENT)
Dept: OBGYN | Facility: CLINIC | Age: 72
End: 2022-02-02

## 2022-02-04 ENCOUNTER — NON-APPOINTMENT (OUTPATIENT)
Age: 72
End: 2022-02-04

## 2022-02-07 ENCOUNTER — NON-APPOINTMENT (OUTPATIENT)
Age: 72
End: 2022-02-07

## 2022-02-07 LAB
APPEARANCE: ABNORMAL
BACTERIA: NEGATIVE
BILIRUBIN URINE: NEGATIVE
BLOOD URINE: ABNORMAL
COLOR: YELLOW
GLUCOSE QUALITATIVE U: NEGATIVE
HYALINE CASTS: 0 /LPF
KETONES URINE: NEGATIVE
LEUKOCYTE ESTERASE URINE: ABNORMAL
MICROSCOPIC-UA: NORMAL
NITRITE URINE: NEGATIVE
PH URINE: 6.5
PROTEIN URINE: ABNORMAL
RED BLOOD CELLS URINE: 39 /HPF
SPECIFIC GRAVITY URINE: 1.02
SQUAMOUS EPITHELIAL CELLS: 1 /HPF
UROBILINOGEN URINE: NORMAL
WHITE BLOOD CELLS URINE: 556 /HPF

## 2022-02-28 ENCOUNTER — APPOINTMENT (OUTPATIENT)
Dept: OBGYN | Facility: CLINIC | Age: 72
End: 2022-02-28
Payer: MEDICARE

## 2022-02-28 PROCEDURE — 81003 URINALYSIS AUTO W/O SCOPE: CPT | Mod: QW

## 2022-03-07 ENCOUNTER — NON-APPOINTMENT (OUTPATIENT)
Age: 72
End: 2022-03-07

## 2022-03-07 LAB
APPEARANCE: CLEAR
BACTERIA UR CULT: ABNORMAL
BACTERIA: NEGATIVE
BILIRUBIN URINE: NEGATIVE
BLOOD URINE: NEGATIVE
COLOR: YELLOW
GLUCOSE QUALITATIVE U: NEGATIVE
HYALINE CASTS: 0 /LPF
KETONES URINE: NEGATIVE
LEUKOCYTE ESTERASE URINE: NEGATIVE
MICROSCOPIC-UA: NORMAL
NITRITE URINE: NEGATIVE
PH URINE: 7
PROTEIN URINE: NORMAL
RED BLOOD CELLS URINE: 7 /HPF
SPECIFIC GRAVITY URINE: 1.02
SQUAMOUS EPITHELIAL CELLS: 2 /HPF
UROBILINOGEN URINE: NORMAL
WHITE BLOOD CELLS URINE: 0 /HPF

## 2022-03-07 RX ORDER — AMPICILLIN 500 MG/1
500 CAPSULE ORAL
Qty: 10 | Refills: 0 | Status: DISCONTINUED | COMMUNITY
Start: 2021-12-06 | End: 2022-03-07

## 2022-03-11 ENCOUNTER — NON-APPOINTMENT (OUTPATIENT)
Age: 72
End: 2022-03-11

## 2022-03-15 ENCOUNTER — APPOINTMENT (OUTPATIENT)
Dept: OBGYN | Facility: CLINIC | Age: 72
End: 2022-03-15
Payer: MEDICARE

## 2022-03-15 VITALS
WEIGHT: 134 LBS | HEIGHT: 63 IN | DIASTOLIC BLOOD PRESSURE: 70 MMHG | SYSTOLIC BLOOD PRESSURE: 120 MMHG | BODY MASS INDEX: 23.74 KG/M2

## 2022-03-15 DIAGNOSIS — Z12.39 ENCOUNTER FOR OTHER SCREENING FOR MALIGNANT NEOPLASM OF BREAST: ICD-10-CM

## 2022-03-15 LAB
BILIRUB UR QL STRIP: NORMAL
GLUCOSE UR-MCNC: NORMAL
HCG UR QL: 0.2 EU/DL
HGB UR QL STRIP.AUTO: NORMAL
KETONES UR-MCNC: NORMAL
LEUKOCYTE ESTERASE UR QL STRIP: ABNORMAL
NITRITE UR QL STRIP: NORMAL
PH UR STRIP: 5
PROT UR STRIP-MCNC: NORMAL
SP GR UR STRIP: 1.01

## 2022-03-15 PROCEDURE — 81003 URINALYSIS AUTO W/O SCOPE: CPT | Mod: QW

## 2022-03-15 PROCEDURE — 99213 OFFICE O/P EST LOW 20 MIN: CPT

## 2022-03-15 NOTE — PLAN
[FreeTextEntry1] : VULVAR IRRITATION, WORSE SINCE RECENTLY WALKING MORE.  BARRIER PETROLATUM JELLY DISCUSSED.  \par \par RECENT RX UTI, FOR ELENA 2-3 WEEKS.\par \par VAGINAL DISCHARGE MINIMAL, BUT ANNOYING, CULTURES SENT.

## 2022-03-15 NOTE — HISTORY OF PRESENT ILLNESS
[N] : Patient does not use contraception [Y] : Positive pregnancy history [Currently Active] : currently active [No] : No [Mammogramdate] : 04/06/21 [TextBox_19] : BR2 [PapSmeardate] : 03/26/19 [TextBox_31] : NEG [BoneDensityDate] : 04/14/17 [LMPDate] : 2006 [PGHxTotal] : 2 [[facility] : 2 [PGHxAbortions] : 1 [[facility]] : 2 [PGHxMultBirths] : 1 [FreeTextEntry1] : 2006

## 2022-03-15 NOTE — PHYSICAL EXAM
[Soft] : soft [Non-tender] : non-tender [No Mass] : no mass [FreeTextEntry7] : POINTS TO AREA OF PAIN ALONG RIGHT LATERAL PFANNENSTIEL INCISION,  NO PALPABLE MASS, NO CVAT [Vulvar Atrophy] : vulvar atrophy [Labia Majora Erythema] : erythema [Atrophy] : atrophy [Discharge] : a  ~M vaginal discharge was present [Scant] : scant [Thin] : thin [Normal] : normal [Uterine Adnexae] : normal [FreeTextEntry4] : MILD ERYTHEMA AT INTROITUS WITHOUT LESIONS

## 2022-03-17 ENCOUNTER — NON-APPOINTMENT (OUTPATIENT)
Age: 72
End: 2022-03-17

## 2022-03-21 LAB — BACTERIA GENITAL AEROBE CULT: NORMAL

## 2022-03-23 ENCOUNTER — APPOINTMENT (OUTPATIENT)
Dept: GYNECOLOGIC ONCOLOGY | Facility: CLINIC | Age: 72
End: 2022-03-23
Payer: MEDICARE

## 2022-03-23 PROCEDURE — 76857 US EXAM PELVIC LIMITED: CPT | Mod: 59

## 2022-03-23 PROCEDURE — 99212 OFFICE O/P EST SF 10 MIN: CPT | Mod: 25

## 2022-03-23 PROCEDURE — 76830 TRANSVAGINAL US NON-OB: CPT | Mod: 59

## 2022-03-23 PROCEDURE — 93976 VASCULAR STUDY: CPT | Mod: 59

## 2022-03-28 ENCOUNTER — APPOINTMENT (OUTPATIENT)
Dept: OBGYN | Facility: CLINIC | Age: 72
End: 2022-03-28
Payer: MEDICARE

## 2022-03-28 PROCEDURE — 81003 URINALYSIS AUTO W/O SCOPE: CPT | Mod: QW

## 2022-03-28 NOTE — REASON FOR VISIT
[FreeTextEntry1] : [place] Location \par \par [facility] [place]. [phone]\par [address] [place] [address] \par \par -Ovarian cyst, PMB, and fibroids with inability to perform EMB (ECC benign) w/ normal ALEXIA. \par -L seen 12/3/21 and discharged back to primary gyn. \par -Returns for follow up on cyst and pain.

## 2022-03-28 NOTE — ASSESSMENT
[FreeTextEntry1] : Ultrasound performed in office today revealed no significant changes.\par \par I advised the patient that there were no significant changes on her US today and I am not concerned. I am recommending patient have a follow up ultrasound in September with her primary gynecologist given she is having no VB. Patient understands if she develops any other symptoms or concerns she should return to see me asap. Patient stated she understood and agreed to comply.

## 2022-03-28 NOTE — HISTORY OF PRESENT ILLNESS
[FreeTextEntry1] : 72 y/o female w/ ovarian cyst, PMB and fibroids with inability to perform EMB (ECC benign) with normal ALEXIA presented to the office on 12/3/21 for a follow up US. Pt was previously seen on 6/2/21 US performed in the office revealed UT 7.2 x 5.9 x3.8 cm, endo 4.7 mm. Multiple calcified fibroids, calcified fibroid 3.0 x 3.3 x 3.2 cm. Calcified fibroid 1.4 x 1.7 x 1.6 cm. Right ovary septated w/ debris measuring 6.0 x 4.3 x 7.0 cm. Septation measures.3 cm. RI.77. I discussed with patient slight decrease in size in cyst. No surgical intervention was warranted at the time and agreed to continue conservative management with follow up ultrasounds. \par \par I discussed with patient that her ultrasound was stable. I explained that her fibroid and cyst were stable. I recommended pt have a f/u US w/ her gynecologist in 6 months. Patient was advised that if she developed any symptoms or concerns she may return to see me at any time. \par \par She recently saw Dr. Carroll for an US on 3/15/22 which revealed Slight increase in size of a 7.0 cm right adnexal cystic lesion with a thin internal septation and a 0.5 cm calcification in the wall. Consider pelvic MRI as needed to guide further clinical management. Calcified uterine fibroids. Nonvisualization of the endometrial stripe. \par \par Patient had complaint of pain and returns to the office today for a f/u US

## 2022-03-28 NOTE — PHYSICAL EXAM
[Normal] : Mood and affect: Normal [FreeTextEntry1] : Lily Montelongo Medical assistant was present during results and discussion.

## 2022-03-29 LAB
APPEARANCE: CLEAR
BACTERIA: NEGATIVE
BILIRUBIN URINE: NEGATIVE
BLOOD URINE: NEGATIVE
COLOR: YELLOW
GLUCOSE QUALITATIVE U: NEGATIVE
HYALINE CASTS: 0 /LPF
KETONES URINE: NORMAL
LEUKOCYTE ESTERASE URINE: NEGATIVE
MICROSCOPIC-UA: NORMAL
NITRITE URINE: NEGATIVE
PH URINE: 6
PROTEIN URINE: NEGATIVE
RED BLOOD CELLS URINE: 2 /HPF
SPECIFIC GRAVITY URINE: 1.02
SQUAMOUS EPITHELIAL CELLS: 1 /HPF
UROBILINOGEN URINE: NORMAL
WHITE BLOOD CELLS URINE: 0 /HPF

## 2022-03-31 ENCOUNTER — NON-APPOINTMENT (OUTPATIENT)
Age: 72
End: 2022-03-31

## 2022-03-31 LAB — BACTERIA UR CULT: ABNORMAL

## 2022-04-12 ENCOUNTER — NON-APPOINTMENT (OUTPATIENT)
Age: 72
End: 2022-04-12

## 2022-04-12 DIAGNOSIS — R92.8 OTHER ABNORMAL AND INCONCLUSIVE FINDINGS ON DIAGNOSTIC IMAGING OF BREAST: ICD-10-CM

## 2022-04-19 ENCOUNTER — APPOINTMENT (OUTPATIENT)
Dept: OBGYN | Facility: CLINIC | Age: 72
End: 2022-04-19
Payer: MEDICARE

## 2022-04-19 ENCOUNTER — NON-APPOINTMENT (OUTPATIENT)
Age: 72
End: 2022-04-19

## 2022-04-19 PROCEDURE — 81003 URINALYSIS AUTO W/O SCOPE: CPT | Mod: QW

## 2022-05-03 LAB
APPEARANCE: CLEAR
BACTERIA: NEGATIVE
BILIRUBIN URINE: NEGATIVE
BLOOD URINE: NEGATIVE
COLOR: YELLOW
GLUCOSE QUALITATIVE U: NEGATIVE
HYALINE CASTS: 0 /LPF
KETONES URINE: NEGATIVE
LEUKOCYTE ESTERASE URINE: NEGATIVE
MICROSCOPIC-UA: NORMAL
NITRITE URINE: NEGATIVE
PH URINE: 6
PROTEIN URINE: NEGATIVE
RED BLOOD CELLS URINE: 2 /HPF
SPECIFIC GRAVITY URINE: 1.02
SQUAMOUS EPITHELIAL CELLS: 1 /HPF
UROBILINOGEN URINE: NORMAL
WHITE BLOOD CELLS URINE: 0 /HPF

## 2022-05-12 ENCOUNTER — RX RENEWAL (OUTPATIENT)
Age: 72
End: 2022-05-12

## 2022-06-02 ENCOUNTER — TRANSCRIPTION ENCOUNTER (OUTPATIENT)
Age: 72
End: 2022-06-02

## 2022-06-13 ENCOUNTER — APPOINTMENT (OUTPATIENT)
Dept: OBGYN | Facility: CLINIC | Age: 72
End: 2022-06-13
Payer: MEDICARE

## 2022-06-13 VITALS
BODY MASS INDEX: 23.04 KG/M2 | SYSTOLIC BLOOD PRESSURE: 120 MMHG | WEIGHT: 130 LBS | HEIGHT: 63 IN | DIASTOLIC BLOOD PRESSURE: 70 MMHG

## 2022-06-13 DIAGNOSIS — L29.2 PRURITUS VULVAE: ICD-10-CM

## 2022-06-13 PROCEDURE — 99215 OFFICE O/P EST HI 40 MIN: CPT

## 2022-06-13 RX ORDER — CLOTRIMAZOLE 10 MG/G
1 CREAM TOPICAL 3 TIMES DAILY
Qty: 1 | Refills: 2 | Status: ACTIVE | COMMUNITY
Start: 2022-06-13 | End: 1900-01-01

## 2022-06-13 NOTE — DISCUSSION/SUMMARY
[FreeTextEntry1] : DEXA RESULT FROM 4/4/2022\par OSTEOPENIA AND FOCAL OSTEOPROSIS\par Results of bone density reviewed with patient. We discussed diet, exercise, calcium, vitamin D, Fall precautions given. Discussed anti-resorptive agents, hormones and hormone agonists and expectant management.\par Encouraged pt to consume 1200 mg. of calcium daily, in foods, or in supplements. If using supplements, correct use of same reviewed. Advised pt  to take at least 1000 iu of Vit D3 daily AS GIVEN BY HER PCP.\par discussed importance of weight bearing and resistance exercise, 5 times weekly for 30 minutes, and finally safety was discussed.\par All medicinal treatment possibilities are reviewed with pt\par Can consider \par EVista for a year, consider PT DECLINE\par possible PT \par Fall precautions given.  Discussed balance, gait, visual health and visibility.  Discussed shoes, assistance with walking, environmental awareness. \par 44 MINUTES SPENT BY THE PROVIDER, INCLUSIVE OF ALL ISSUES RELATED TO THIS ENCOUNTER ON THE DATE OF SERVICE. \par  ALL QUESTIONS ANSWERED\par DISCUSSED PRECAUTIONS AGAINST COVID19, INCLUDING MASK WEARING, SOCIAL DISTANCING AND HAND WASHING.\par

## 2022-06-13 NOTE — COUNSELING
[Nutrition/ Exercise/ Weight Management] : nutrition, exercise, weight management [Vitamins/Supplements] : vitamins/supplements [Sunscreen] : sunscreen

## 2022-06-13 NOTE — PHYSICAL EXAM
[Chaperone Present] : A chaperone was present in the examining room during all aspects of the physical examination [Appropriately responsive] : appropriately responsive [Alert] : alert [No Acute Distress] : no acute distress [No Lymphadenopathy] : no lymphadenopathy [Regular Rate Rhythm] : regular rate rhythm [No Murmurs] : no murmurs [Clear to Auscultation B/L] : clear to auscultation bilaterally [Soft] : soft [Non-tender] : non-tender [Non-distended] : non-distended [No HSM] : No HSM [No Lesions] : no lesions [No Mass] : no mass [Oriented x3] : oriented x3 [Labia Majora] : normal [Labia Minora] : normal [Atrophy] : atrophy [Normal] : normal [Uterine Adnexae] : normal [No Tenderness] : no tenderness [FreeTextEntry1] : JACK HARTMANN

## 2022-06-13 NOTE — HISTORY OF PRESENT ILLNESS
[Currently Active] : currently active [Men] : men [No] : No [Patient refuses STI testing] : Patient refuses STI testing [FreeTextEntry1] : HERE FOR dexa consult, BV

## 2022-06-15 LAB
C TRACH RRNA SPEC QL NAA+PROBE: NOT DETECTED
CANDIDA VAG CYTO: NOT DETECTED
G VAGINALIS+PREV SP MTYP VAG QL MICRO: NOT DETECTED
N GONORRHOEA RRNA SPEC QL NAA+PROBE: NOT DETECTED
SOURCE AMPLIFICATION: NORMAL
T VAGINALIS VAG QL WET PREP: NOT DETECTED

## 2022-06-30 ENCOUNTER — NON-APPOINTMENT (OUTPATIENT)
Age: 72
End: 2022-06-30

## 2022-07-27 ENCOUNTER — TRANSCRIPTION ENCOUNTER (OUTPATIENT)
Age: 72
End: 2022-07-27

## 2022-08-11 ENCOUNTER — RX RENEWAL (OUTPATIENT)
Age: 72
End: 2022-08-11

## 2022-09-21 ENCOUNTER — APPOINTMENT (OUTPATIENT)
Dept: GYNECOLOGIC ONCOLOGY | Facility: CLINIC | Age: 72
End: 2022-09-21

## 2022-09-22 ENCOUNTER — APPOINTMENT (OUTPATIENT)
Dept: GYNECOLOGIC ONCOLOGY | Facility: CLINIC | Age: 72
End: 2022-09-22

## 2022-09-22 VITALS
WEIGHT: 136.8 LBS | OXYGEN SATURATION: 98 % | BODY MASS INDEX: 24.24 KG/M2 | DIASTOLIC BLOOD PRESSURE: 70 MMHG | SYSTOLIC BLOOD PRESSURE: 109 MMHG | HEART RATE: 84 BPM | HEIGHT: 63 IN

## 2022-09-22 PROCEDURE — 93976 VASCULAR STUDY: CPT | Mod: 59

## 2022-09-22 PROCEDURE — 76830 TRANSVAGINAL US NON-OB: CPT | Mod: 59

## 2022-09-22 PROCEDURE — 76857 US EXAM PELVIC LIMITED: CPT | Mod: 59

## 2022-09-22 PROCEDURE — 99213 OFFICE O/P EST LOW 20 MIN: CPT | Mod: 25

## 2022-10-06 NOTE — ASSESSMENT
[FreeTextEntry1] : Ultrasound performed in office revealed:\par Uterus - 8.1 x 4.9 x 5.8 cm \par Endometrial thickness - 2.7 mm\par Stable calcified fibroids, largest 2.8 cm, previously 3.2 cm\par Right ovary not seen, right adnexa septated cyst noted again 7.1 cm x 7.0 x 5.9 cm (stable)\par LTO surgically Absent\par \par Patient states she had labs this morning for pcp, my office will try to add CEA and ALEXIA on to labs. The patient just started probiotic culturelle and her estrace has been increased. She states she has had vaginal itching present for several months which she saw her primary gynecologist for and was given prescription cream to use. Reviewed proper hygiene with patient and I recommended moisturizing the area with aquaphor. \par \par Patient curious about surgery to remove the cyst, which I reviewed risks and benefits of. I would remove both fallopian tubes and remaining right ovary. She may elect to have surgery if she chooses. No pain currently but she does note occasional bloating, but I recommended she follow with GI to make sure there is not another underlying issue.\par \par Patient to call if she chooses to proceed with surgery, as it is elective.

## 2022-10-06 NOTE — REASON FOR VISIT
[FreeTextEntry1] : [place] Location\par \par [facility] [place]. [phone]\par [address] [place] [address]\par \par -Ovarian cyst, PMB, and fibroids with inability to perform EMB (ECC benign) w/ normal ALEXIA

## 2022-10-06 NOTE — HISTORY OF PRESENT ILLNESS
[FreeTextEntry1] : 71 y/o patient  w/ ovarian cyst, PMB and fibroids with inability to perform EMB (ECC benign) with normal ALEXIA.

## 2022-10-06 NOTE — PHYSICAL EXAM
[Normal] : Anus and perineum: Normal sphincter tone, no masses, no prolapse. [Abnormal] : Recto-Vaginal Exam: Abnormal [de-identified] : left ovary surgically absent [de-identified] : mobile smooth ovarian mass [de-identified] : Kaleigh Boo Medical assistant chaperoned during gynecologic exam.

## 2022-11-14 ENCOUNTER — RX RENEWAL (OUTPATIENT)
Age: 72
End: 2022-11-14

## 2022-12-08 ENCOUNTER — NON-APPOINTMENT (OUTPATIENT)
Age: 72
End: 2022-12-08

## 2022-12-08 DIAGNOSIS — M17.12 UNILATERAL PRIMARY OSTEOARTHRITIS, LEFT KNEE: ICD-10-CM

## 2022-12-08 DIAGNOSIS — M17.11 UNILATERAL PRIMARY OSTEOARTHRITIS, RIGHT KNEE: ICD-10-CM

## 2022-12-08 DIAGNOSIS — Z86.79 PERSONAL HISTORY OF OTHER DISEASES OF THE CIRCULATORY SYSTEM: ICD-10-CM

## 2022-12-08 DIAGNOSIS — M25.562 PAIN IN LEFT KNEE: ICD-10-CM

## 2023-02-24 ENCOUNTER — RX RENEWAL (OUTPATIENT)
Age: 73
End: 2023-02-24

## 2023-03-15 ENCOUNTER — NON-APPOINTMENT (OUTPATIENT)
Age: 73
End: 2023-03-15

## 2023-03-23 ENCOUNTER — APPOINTMENT (OUTPATIENT)
Dept: GYNECOLOGIC ONCOLOGY | Facility: CLINIC | Age: 73
End: 2023-03-23

## 2023-04-04 ENCOUNTER — FORM ENCOUNTER (OUTPATIENT)
Age: 73
End: 2023-04-04

## 2023-06-12 ENCOUNTER — OFFICE (OUTPATIENT)
Dept: URBAN - METROPOLITAN AREA CLINIC 12 | Facility: CLINIC | Age: 73
Setting detail: OPHTHALMOLOGY
End: 2023-06-12
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
## 2023-06-12 PROCEDURE — NO SHOW FE NO SHOW FEE: Performed by: OPHTHALMOLOGY

## 2023-06-19 ENCOUNTER — RX RENEWAL (OUTPATIENT)
Age: 73
End: 2023-06-19

## 2023-07-27 ENCOUNTER — OFFICE (OUTPATIENT)
Dept: URBAN - METROPOLITAN AREA CLINIC 12 | Facility: CLINIC | Age: 73
Setting detail: OPHTHALMOLOGY
End: 2023-07-27
Payer: MEDICARE

## 2023-07-27 DIAGNOSIS — G43.109: ICD-10-CM

## 2023-07-27 DIAGNOSIS — H01.004: ICD-10-CM

## 2023-07-27 DIAGNOSIS — H35.363: ICD-10-CM

## 2023-07-27 DIAGNOSIS — Z96.1: ICD-10-CM

## 2023-07-27 DIAGNOSIS — H43.393: ICD-10-CM

## 2023-07-27 DIAGNOSIS — H43.813: ICD-10-CM

## 2023-07-27 DIAGNOSIS — H16.223: ICD-10-CM

## 2023-07-27 DIAGNOSIS — H01.001: ICD-10-CM

## 2023-07-27 PROCEDURE — 92250 FUNDUS PHOTOGRAPHY W/I&R: CPT | Performed by: OPHTHALMOLOGY

## 2023-07-27 PROCEDURE — 92014 COMPRE OPH EXAM EST PT 1/>: CPT | Performed by: OPHTHALMOLOGY

## 2023-07-27 ASSESSMENT — REFRACTION_CURRENTRX
OD_OVR_VA: 20/
OD_OVR_VA: 20/
OS_VPRISM_DIRECTION: SV
OS_VPRISM_DIRECTION: SV
OS_CYLINDER: -0.50
OS_SPHERE: +1.00
OD_VPRISM_DIRECTION: SV
OD_SPHERE: +1.00
OS_SPHERE: -0.50
OD_CYLINDER: -0.50
OD_SPHERE: -0.50
OD_VPRISM_DIRECTION: SV
OS_AXIS: 065
OD_AXIS: 119
OS_OVR_VA: 20/
OS_OVR_VA: 20/

## 2023-07-27 ASSESSMENT — VISUAL ACUITY
OS_BCVA: 20/20
OD_BCVA: 20/20

## 2023-07-27 ASSESSMENT — REFRACTION_MANIFEST
OD_SPHERE: -0.25
OD_AXIS: 120
OS_SPHERE: -0.75
OD_CYLINDER: -0.50
OS_CYLINDER: -0.50
OS_AXIS: 086

## 2023-07-27 ASSESSMENT — REFRACTION_AUTOREFRACTION
OS_AXIS: 086
OS_CYLINDER: -0.50
OD_CYLINDER: -0.50
OD_SPHERE: -0.25
OS_SPHERE: -0.75
OD_AXIS: 120

## 2023-07-27 ASSESSMENT — SUPERFICIAL PUNCTATE KERATITIS (SPK)
OD_SPK: 1+
OS_SPK: T

## 2023-07-27 ASSESSMENT — SPHEQUIV_DERIVED
OD_SPHEQUIV: -0.5
OS_SPHEQUIV: -1
OD_SPHEQUIV: -0.5
OS_SPHEQUIV: -1

## 2023-07-27 ASSESSMENT — CONFRONTATIONAL VISUAL FIELD TEST (CVF)
OD_FINDINGS: FULL
OS_FINDINGS: FULL

## 2023-07-27 ASSESSMENT — KERATOMETRY
METHOD_AUTO_MANUAL: AUTO
OD_K1POWER_DIOPTERS: 45.75
OS_K2POWER_DIOPTERS: 46.00
OD_AXISANGLE_DEGREES: 063
OS_K1POWER_DIOPTERS: 46.00
OS_AXISANGLE_DEGREES: 090
OD_K2POWER_DIOPTERS: 46.00

## 2023-07-27 ASSESSMENT — DRY EYES - PHYSICIAN NOTES: OD_GENERALCOMMENTS: SUPERIOR

## 2023-07-27 ASSESSMENT — LID EXAM ASSESSMENTS
OD_BLEPHARITIS: RUL 1+
OS_BLEPHARITIS: LUL 1+

## 2023-07-27 ASSESSMENT — TONOMETRY
OS_IOP_MMHG: 15
OD_IOP_MMHG: 16

## 2023-07-27 ASSESSMENT — AXIALLENGTH_DERIVED
OD_AL: 22.9333
OD_AL: 22.9333
OS_AL: 23.0748
OS_AL: 23.0748

## 2023-08-06 ENCOUNTER — NON-APPOINTMENT (OUTPATIENT)
Age: 73
End: 2023-08-06

## 2023-08-07 ENCOUNTER — APPOINTMENT (OUTPATIENT)
Dept: OBGYN | Facility: CLINIC | Age: 73
End: 2023-08-07
Payer: MEDICARE

## 2023-08-07 VITALS
WEIGHT: 132 LBS | HEIGHT: 64 IN | DIASTOLIC BLOOD PRESSURE: 68 MMHG | SYSTOLIC BLOOD PRESSURE: 112 MMHG | BODY MASS INDEX: 22.53 KG/M2

## 2023-08-07 DIAGNOSIS — Z12.11 ENCOUNTER FOR SCREENING FOR MALIGNANT NEOPLASM OF COLON: ICD-10-CM

## 2023-08-07 DIAGNOSIS — Z12.4 ENCOUNTER FOR SCREENING FOR MALIGNANT NEOPLASM OF CERVIX: ICD-10-CM

## 2023-08-07 DIAGNOSIS — M85.80 OTHER SPECIFIED DISORDERS OF BONE DENSITY AND STRUCTURE, UNSPECIFIED SITE: ICD-10-CM

## 2023-08-07 DIAGNOSIS — N39.0 URINARY TRACT INFECTION, SITE NOT SPECIFIED: ICD-10-CM

## 2023-08-07 LAB
DATE COLLECTED: NORMAL
HEMOCCULT SP1 STL QL: NEGATIVE
QUALITY CONTROL: YES

## 2023-08-07 PROCEDURE — 99214 OFFICE O/P EST MOD 30 MIN: CPT

## 2023-08-07 PROCEDURE — 82270 OCCULT BLOOD FECES: CPT

## 2023-08-07 NOTE — PHYSICAL EXAM
[Chaperone Present] : A chaperone was present in the examining room during all aspects of the physical examination [FreeTextEntry1] : MARY KATE SANTIAGO [Appropriately responsive] : appropriately responsive [Alert] : alert [No Acute Distress] : no acute distress [Soft] : soft [Non-tender] : non-tender [Non-distended] : non-distended [No HSM] : No HSM [No Lesions] : no lesions [No Mass] : no mass [Oriented x3] : oriented x3 [FreeTextEntry7] : POINTS TO AREA OF PAIN ALONG RIGHT LATERAL PFANNENSTIEL INCISION,  NO PALPABLE MASS, NO CVAT [Examination Of The Breasts] : a normal appearance [No Masses] : no breast masses were palpable [Vulvar Atrophy] : vulvar atrophy [Labia Majora] : normal [Labia Minora] : normal [Atrophy] : atrophy [Normal] : normal [___] : [unfilled] cm mass on the left [No Tenderness] : no tenderness [Nl Sphincter Tone] : normal sphincter tone [FreeTextEntry6] : C/O "FEELS THAT" ON RIGHT SIDE ON BIMANUAL EXAM, LEFT ADNEXAL FULLNESS WITHOUT DISCRETE MASS [FreeTextEntry9] : GUAIAC NEGATIVE

## 2023-08-07 NOTE — PLAN
[FreeTextEntry1] :  COMPLEX OVARIAN CYST, STABLE, ASYMPTOMATIC EXCEPT FOR BLOATING, HAS MADE DIETARY CHANGES, LAST SEEN ON SONOGRAM 9/2022. DID NOT FOLLOW THROUGH YET WITH 6 MONTH SONOGRAM, WILL SCHEDULE SAME, REFER TO GYN ONC FOR CONSULTATION.  CERVICAL CANCER SCREENING, PAP DONE. BREAST CANCER SCREENING, FOR MAMMOGRAM 4/2024. OSTEOPENIA AND FOCAL OSTEOPOROSIS, CONTINUE EXERCISE AND D3 SUPPLEMENTS.

## 2023-08-07 NOTE — HISTORY OF PRESENT ILLNESS
[TextBox_4] : ANNUAL [Mammogramdate] : 4/13/23 [TextBox_19] : BR 2 [TextBox_25] : BR 2 [PapSmeardate] : 3/26/19 [TextBox_31] : NEGATIVE [BoneDensityDate] : 4/4/22 [TextBox_37] : BOTH [ColonoscopyDate] : 2021 [LMPDate] : AGE 54 [TextBox_6] : AGE 54 [FreeTextEntry1] : AGE 54 [No] : Patient does not have concerns regarding sex

## 2023-08-10 LAB — CYTOLOGY CVX/VAG DOC THIN PREP: ABNORMAL

## 2023-08-28 ENCOUNTER — ASOB RESULT (OUTPATIENT)
Age: 73
End: 2023-08-28

## 2023-08-28 ENCOUNTER — NON-APPOINTMENT (OUTPATIENT)
Age: 73
End: 2023-08-28

## 2023-08-28 ENCOUNTER — APPOINTMENT (OUTPATIENT)
Dept: ANTEPARTUM | Facility: CLINIC | Age: 73
End: 2023-08-28
Payer: MEDICARE

## 2023-08-28 PROCEDURE — 76830 TRANSVAGINAL US NON-OB: CPT

## 2023-10-11 ENCOUNTER — APPOINTMENT (OUTPATIENT)
Dept: GYNECOLOGIC ONCOLOGY | Facility: CLINIC | Age: 73
End: 2023-10-11
Payer: MEDICARE

## 2023-10-11 VITALS
DIASTOLIC BLOOD PRESSURE: 86 MMHG | OXYGEN SATURATION: 98 % | WEIGHT: 130 LBS | HEIGHT: 64 IN | BODY MASS INDEX: 22.2 KG/M2 | SYSTOLIC BLOOD PRESSURE: 132 MMHG | HEART RATE: 74 BPM

## 2023-10-11 PROCEDURE — 99214 OFFICE O/P EST MOD 30 MIN: CPT

## 2023-11-05 LAB
CA 125 (LABCORP): 20.5 U/ML
HE4X: 69 PMOL/L
POSTMENOPAUSAL ROMA: 1.86
PREMENOPAUSAL ROMA: 1.5
ROMA COMMENT: NORMAL

## 2024-04-12 ENCOUNTER — APPOINTMENT (OUTPATIENT)
Dept: ORTHOPEDIC SURGERY | Facility: CLINIC | Age: 74
End: 2024-04-12
Payer: MEDICARE

## 2024-04-12 VITALS
HEIGHT: 64 IN | SYSTOLIC BLOOD PRESSURE: 121 MMHG | WEIGHT: 130 LBS | DIASTOLIC BLOOD PRESSURE: 75 MMHG | BODY MASS INDEX: 22.2 KG/M2 | HEART RATE: 87 BPM

## 2024-04-12 DIAGNOSIS — M25.551 PAIN IN RIGHT HIP: ICD-10-CM

## 2024-04-12 PROCEDURE — 99204 OFFICE O/P NEW MOD 45 MIN: CPT

## 2024-04-12 PROCEDURE — 73502 X-RAY EXAM HIP UNI 2-3 VIEWS: CPT

## 2024-04-12 NOTE — CONSULT LETTER
[Dear  ___] : Dear  [unfilled], [Consult Letter:] : I had the pleasure of evaluating your patient, [unfilled]. [Please see my note below.] : Please see my note below. [Consult Closing:] : Thank you very much for allowing me to participate in the care of this patient.  If you have any questions, please do not hesitate to contact me. [Sincerely,] : Sincerely, [FreeTextEntry3] : Luis Ruff MD Chief of Joint Replacement Primary & Revision Hip and Knee Replacement  [facility] [place]

## 2024-04-12 NOTE — HISTORY OF PRESENT ILLNESS
[de-identified] : Ms. RACHID AGUAYO is a 73 year old female presenting for evaluation of right hip/buttock pain. Her hip pain is localized posterior and to the right buttock. At times the pain radiates down the posterior aspect of the leg. Patient denies any groin or anterior hip pain. She first notices the pain after her foot stopped short while walking in February. Patient has not had any injections or PT. She has tried PT without improvement.

## 2024-04-12 NOTE — PHYSICAL EXAM
[de-identified] : The patient appears well nourished and in no apparent distress. The patient is alert and oriented to person, place, and time. Affect and mood appear normal. The head is normocephalic and atraumatic.  The eyes reveal normal sclera and extra ocular muscles are intact. The tongue is midline with no apparent lesions.  Skin shows normal turgor with no evidence of eczema or psoriasis.  No respiratory distress noted.  Sensation grossly intact.		  [de-identified] : Exam of the right hip shows hip flexion of 120 degrees, hip external rotation of 60 degrees, hip internal rotation of 30 degrees. There is no pain with range of motion. Patient can perform a straight leg raise. There is no significant tenderness over the GT bursa.  5/5 motor strength bilaterally distally. Sensation intact distally.		  [de-identified] : X-ray: AP view of the pelvis and 2 views of the right hip demonstrate a well-preserved right hip joint space.

## 2024-04-12 NOTE — ADDENDUM
[FreeTextEntry1] : This note was authored by Dereck Ornelas working as a medical scribe for Dr. Luis Ruff. The note was reviewed, edited, and revised by Dr. Luis Ruff whom is in agreement with the exam findings, imaging findings, and treatment plan. 04/12/2024

## 2024-04-12 NOTE — DISCUSSION/SUMMARY
[de-identified] : RACHID AGUAYO is a 73 year old female who presents with right hip pain. Exam of her right hip was benign in office today and x-ray did not show any significant hip etiology either. Her pain is consistent with sciatica. As such, a prescription for a Medrol dose pack was provided. The patient will follow up with physiatry for further evaluation of her pain and a referral was provided.

## 2024-04-15 ENCOUNTER — APPOINTMENT (OUTPATIENT)
Dept: OBGYN | Facility: CLINIC | Age: 74
End: 2024-04-15
Payer: MEDICARE

## 2024-04-15 VITALS
WEIGHT: 130 LBS | DIASTOLIC BLOOD PRESSURE: 70 MMHG | BODY MASS INDEX: 22.2 KG/M2 | HEIGHT: 64 IN | SYSTOLIC BLOOD PRESSURE: 120 MMHG

## 2024-04-15 DIAGNOSIS — N95.2 POSTMENOPAUSAL ATROPHIC VAGINITIS: ICD-10-CM

## 2024-04-15 DIAGNOSIS — D25.0 INTRAMURAL LEIOMYOMA OF UTERUS: ICD-10-CM

## 2024-04-15 DIAGNOSIS — D25.1 INTRAMURAL LEIOMYOMA OF UTERUS: ICD-10-CM

## 2024-04-15 PROCEDURE — 99213 OFFICE O/P EST LOW 20 MIN: CPT

## 2024-04-15 NOTE — PLAN
[FreeTextEntry1] : INCIDENTALLY NOTED MYOMATA ON HIP XRAYS, PROBABLY CALCIFIED.  FOR F/U, COMPARISON TO 8/2023.  NO NEW PELVIC SYMPTOMS TO SUGGEST CHANGE IN SIZE. ATROPHIC VAGINITIS, R/N VAGINAL E2 AS NEEDED.

## 2024-04-15 NOTE — PHYSICAL EXAM
[Soft] : soft [Non-tender] : non-tender [Non-distended] : non-distended [No HSM] : No HSM [No Lesions] : no lesions [No Mass] : no mass [FreeTextEntry7] : NO PALPABLE FIBROIDS AT ABDOMEN OR SUPRAPUBIC REGION

## 2024-04-15 NOTE — HISTORY OF PRESENT ILLNESS
[Mammogramdate] : 4/13/23 [TextBox_19] : BR 2 [PapSmeardate] : 8/7/23 [TextBox_31] : ATROPHIC  [BoneDensityDate] : 4/4/22 [TextBox_37] : BOTH [ColonoscopyDate] : 2021 [LMPDate] : AGE 54 [TextBox_6] : AGE 54 [FreeTextEntry1] : AGE 54

## 2024-05-13 ENCOUNTER — ASOB RESULT (OUTPATIENT)
Age: 74
End: 2024-05-13

## 2024-05-13 ENCOUNTER — APPOINTMENT (OUTPATIENT)
Dept: OBGYN | Facility: CLINIC | Age: 74
End: 2024-05-13
Payer: MEDICARE

## 2024-05-13 ENCOUNTER — APPOINTMENT (OUTPATIENT)
Dept: ANTEPARTUM | Facility: CLINIC | Age: 74
End: 2024-05-13
Payer: MEDICARE

## 2024-05-13 ENCOUNTER — APPOINTMENT (OUTPATIENT)
Dept: PHYSICAL MEDICINE AND REHAB | Facility: CLINIC | Age: 74
End: 2024-05-13
Payer: MEDICARE

## 2024-05-13 VITALS
HEART RATE: 71 BPM | WEIGHT: 131 LBS | RESPIRATION RATE: 15 BRPM | SYSTOLIC BLOOD PRESSURE: 120 MMHG | HEIGHT: 64 IN | DIASTOLIC BLOOD PRESSURE: 76 MMHG | BODY MASS INDEX: 22.36 KG/M2

## 2024-05-13 VITALS
DIASTOLIC BLOOD PRESSURE: 62 MMHG | BODY MASS INDEX: 22.36 KG/M2 | SYSTOLIC BLOOD PRESSURE: 120 MMHG | HEIGHT: 64 IN | WEIGHT: 131 LBS

## 2024-05-13 DIAGNOSIS — M47.816 SPONDYLOSIS W/OUT MYELOPATHY OR RADICULOPATHY, LUMBAR REGION: ICD-10-CM

## 2024-05-13 DIAGNOSIS — D25.1 INTRAMURAL LEIOMYOMA OF UTERUS: ICD-10-CM

## 2024-05-13 DIAGNOSIS — D25.2 INTRAMURAL LEIOMYOMA OF UTERUS: ICD-10-CM

## 2024-05-13 DIAGNOSIS — M67.951 UNSPECIFIED DISORDER OF SYNOVIUM AND TENDON, RIGHT THIGH: ICD-10-CM

## 2024-05-13 DIAGNOSIS — Z87.39 PERSONAL HISTORY OF OTHER DISEASES OF THE MUSCULOSKELETAL SYSTEM AND CONNECTIVE TISSUE: ICD-10-CM

## 2024-05-13 DIAGNOSIS — R39.9 UNSPECIFIED SYMPTOMS AND SIGNS INVOLVING THE GENITOURINARY SYSTEM: ICD-10-CM

## 2024-05-13 PROCEDURE — 99214 OFFICE O/P EST MOD 30 MIN: CPT

## 2024-05-13 PROCEDURE — 76830 TRANSVAGINAL US NON-OB: CPT

## 2024-05-13 PROCEDURE — 76857 US EXAM PELVIC LIMITED: CPT | Mod: 59

## 2024-05-13 PROCEDURE — 99204 OFFICE O/P NEW MOD 45 MIN: CPT

## 2024-05-13 RX ORDER — PANTOPRAZOLE 20 MG/1
20 TABLET, DELAYED RELEASE ORAL DAILY
Refills: 0 | Status: DISCONTINUED | COMMUNITY
End: 2024-05-13

## 2024-05-13 RX ORDER — METHYLPREDNISOLONE 4 MG/1
4 TABLET ORAL
Qty: 1 | Refills: 0 | Status: DISCONTINUED | COMMUNITY
Start: 2024-04-12 | End: 2024-05-13

## 2024-05-13 RX ORDER — CEPHALEXIN 500 MG/1
500 CAPSULE ORAL TWICE DAILY
Qty: 10 | Refills: 0 | Status: DISCONTINUED | COMMUNITY
Start: 2022-03-31 | End: 2024-05-13

## 2024-05-13 NOTE — DATA REVIEWED
[FreeTextEntry1] : X-ray Pelvis 4/14/24 reviewed and interpreted by me: well prescribed R hip joint space seen

## 2024-05-13 NOTE — ASSESSMENT
[FreeTextEntry1] : Ms. RACHID AGUAYO is a 73 year old female who presents with R>L sided lower back/buttock pain, likely due to a gluteal tendonitis rather than a lumbar radiculopathy at this time. Denies any red flag signs. Will recommend: - X-ray Pelvis reviewed - Start PT 2-3x/week for stretching, strengthening (especially of core muscles), ROM exercises, HEP and modalities PRN including myofascial release, moist heat  - Methocarbamol 250mg Qhs PRN. Advised of side effects including sedation.  - Patient to continue Tylenol, up to 2g/day if needed - If pain continues, could consider advanced imaging at next visit.   RTC in 4-6 weeks. Patient aware of red flag signs including any changes to their bowel/bladder control, groin numbness or new weakness. Patient knows to seek immediate attention by calling 911 or going to nearest ER if these symptoms appear.

## 2024-05-13 NOTE — PHYSICAL EXAM
[FreeTextEntry1] : PE: Constitutional: In NAD, calm and cooperative MSK (Back)  Inspection: no gross swelling identified  Palpation: Tenderness of the bilateral lower lumbar paraspinals/upper gluteal muscles, R>L  ROM: No pain or limitation with end lumbar extension/flexion  Strength: 5/5 strength in bilateral lower extremities  Reflexes: 2+ Patella reflex bilaterally, 2+ Achilles reflex bilaterally, negative clonus bilaterally  Sensation: Intact to light touch in bilateral lower extremities Special tests: SLR: negative bilaterally MARIPOSA: negative bilaterally FADIR: negative bilaterally Facet loading: positive bilaterally

## 2024-05-13 NOTE — HISTORY OF PRESENT ILLNESS
[FreeTextEntry1] : Ms. RACHID AGUAYO is a 73 year old female with a PMHx of stomach ulcers who presents with low back pain.   Location: R lower back/buttock Onset: Started early 1/2024, R foot got caught on carpet, which started pain.  Provocation/Palliative: Worse with activity, better with rest Quality: Tightness, spasm Radiation: Down posterior R leg Severity: Mild now Timing: Improved somewhat over last weeks    Denies any associated numbness. Denies any associated leg weakness. Denies any loss of bowel/bladder control or any groin numbness. Previous medications trialed: medrol dose sarah with some help, CBD pills without help Previous procedures relevant to complaint: None Conservative therapy tried?:  (PT) has shown her some exercises

## 2024-05-14 PROBLEM — D25.1 INTRAMURAL AND SUBSEROUS LEIOMYOMA OF UTERUS: Status: ACTIVE | Noted: 2024-05-14

## 2024-05-14 NOTE — HISTORY OF PRESENT ILLNESS
[FreeTextEntry1] : Anastasiia presents for follow up of fibroid uterus. She was having hip pain and had an xray with ortho, calcifications were seen and he advised her to check up with GYN for evaluation.  She has been followed with sonograms due to fibroids and 7cm complex cyst in right ovary which has been since for several years. She had negative ALEXIA. She has had GYN ONC consult with Dr. Gates and is not sure re surgery. She gets cramping on the right side and is considering removal of her ovary, but would like a second consult. She is also having UTI symptoms

## 2024-05-14 NOTE — DISCUSSION/SUMMARY
[FreeTextEntry1] : We reviewed the sonogram which showed stable findings and known calcified fibroids. I reassured the patient regarding the fibroids that were also seen on the xray. She inquired regarding surgery for the pelvic mass. I explained that the mass has some concerning features but has been stable over the course of several years. However given the concerning imaging features I explained that it would be indicated to consider surgery. She would like a second opinion and I recommended Dr. Garvey.  If she does not proceed with surgery she should have periodic surveillance with pelvic sonogram.

## 2024-05-15 LAB — BACTERIA UR CULT: NORMAL

## 2024-05-20 ENCOUNTER — NON-APPOINTMENT (OUTPATIENT)
Age: 74
End: 2024-05-20

## 2024-05-25 PROBLEM — H40.003 GLAUCOMA SUSPECT; BOTH EYES: Status: ACTIVE | Noted: 2024-05-25

## 2024-06-18 ENCOUNTER — APPOINTMENT (OUTPATIENT)
Dept: GYNECOLOGIC ONCOLOGY | Facility: CLINIC | Age: 74
End: 2024-06-18
Payer: MEDICARE

## 2024-06-18 VITALS
SYSTOLIC BLOOD PRESSURE: 122 MMHG | WEIGHT: 130 LBS | DIASTOLIC BLOOD PRESSURE: 79 MMHG | OXYGEN SATURATION: 99 % | HEART RATE: 73 BPM | BODY MASS INDEX: 22.2 KG/M2 | HEIGHT: 64 IN

## 2024-06-18 DIAGNOSIS — Z80.0 FAMILY HISTORY OF MALIGNANT NEOPLASM OF DIGESTIVE ORGANS: ICD-10-CM

## 2024-06-18 DIAGNOSIS — Z87.42 PERSONAL HISTORY OF OTHER DISEASES OF THE FEMALE GENITAL TRACT: ICD-10-CM

## 2024-06-18 DIAGNOSIS — N83.299 OTHER OVARIAN CYST, UNSPECIFIED SIDE: ICD-10-CM

## 2024-06-18 DIAGNOSIS — R10.9 UNSPECIFIED ABDOMINAL PAIN: ICD-10-CM

## 2024-06-18 PROCEDURE — 99215 OFFICE O/P EST HI 40 MIN: CPT

## 2024-06-18 PROCEDURE — 99459 PELVIC EXAMINATION: CPT

## 2024-06-18 RX ORDER — METHOCARBAMOL 500 MG/1
500 TABLET, FILM COATED ORAL
Qty: 30 | Refills: 0 | Status: DISCONTINUED | COMMUNITY
Start: 2024-05-13 | End: 2024-06-18

## 2024-06-18 RX ORDER — PANTOPRAZOLE 40 MG/1
40 TABLET, DELAYED RELEASE ORAL
Qty: 180 | Refills: 0 | Status: DISCONTINUED | COMMUNITY
Start: 2019-08-21 | End: 2024-06-18

## 2024-06-18 RX ORDER — OMEGA-3/DHA/EPA/FISH OIL 300-1000MG
400 CAPSULE ORAL
Refills: 0 | Status: DISCONTINUED | COMMUNITY
Start: 2020-05-15 | End: 2024-06-18

## 2024-06-18 RX ORDER — ESTRADIOL 10 UG/1
10 TABLET, FILM COATED VAGINAL
Qty: 36 | Refills: 0 | Status: DISCONTINUED | COMMUNITY
Start: 2020-05-15 | End: 2024-06-18

## 2024-06-18 RX ORDER — NITROFURANTOIN (MONOHYDRATE/MACROCRYSTALS) 25; 75 MG/1; MG/1
100 CAPSULE ORAL TWICE DAILY
Qty: 10 | Refills: 0 | Status: DISCONTINUED | COMMUNITY
Start: 2022-03-07 | End: 2024-06-18

## 2024-06-18 RX ORDER — PNV NO.95/FERROUS FUM/FOLIC AC 28MG-0.8MG
TABLET ORAL
Refills: 0 | Status: ACTIVE | COMMUNITY

## 2024-06-18 RX ORDER — BETAMETHASONE DIPROPIONATE 0.5 MG/G
0.05 CREAM TOPICAL TWICE DAILY
Qty: 1 | Refills: 3 | Status: DISCONTINUED | COMMUNITY
Start: 2022-06-13 | End: 2024-06-18

## 2024-06-18 RX ORDER — METRONIDAZOLE 7.5 MG/G
0.75 GEL VAGINAL
Qty: 5 | Refills: 0 | Status: DISCONTINUED | COMMUNITY
Start: 2021-11-18 | End: 2024-06-18

## 2024-06-18 RX ORDER — BACILLUS COAGULANS/INULIN 1B-250 MG
CAPSULE ORAL
Refills: 0 | Status: ACTIVE | COMMUNITY

## 2024-06-18 RX ORDER — ESTRADIOL 0.1 MG/G
0.1 CREAM VAGINAL
Qty: 42.5 | Refills: 3 | Status: DISCONTINUED | COMMUNITY
Start: 2021-02-26 | End: 2024-06-18

## 2024-06-18 RX ORDER — OMEPRAZOLE 20 MG/1
20 CAPSULE, DELAYED RELEASE ORAL
Refills: 0 | Status: ACTIVE | COMMUNITY

## 2024-06-18 NOTE — OB HISTORY
[Total Preg ___] : : [unfilled] [Premature ___] : [unfilled] (premature) [Living ___] : [unfilled] (living) [AB Induced ___] : [unfilled] elective (s) [Multiple Births ___] : [unfilled] multiple births [Menopause  Age ____] : menopause occurred at age [unfilled] [Full Term ___] : [unfilled] (full-term) [Vaginal ___] : [unfilled] vaginal delivery(s)

## 2024-06-21 LAB — CANCER AG125 SERPL-ACNC: 20 U/ML

## 2024-06-21 NOTE — DISCUSSION/SUMMARY
[Reviewed Clinical Lab Test(s)] : Results of clinical tests were reviewed. [Reviewed Radiology Report(s)] : Radiology reports were reviewed. [Discuss Tests w/Referring Providers] : Results of labs/radiology studies and the treatment recommendations were discussed with performing/referring physician. [Discuss Alternatives/Risks/Benefits w/Patient] : All alternatives, risks, and benefits were discussed with the patient/family and all questions were answered.  Patient expressed good understanding and appreciates the importance of follow up as recommended. [FreeTextEntry1] : - pelvic MRI - 7/2 -  (addendum:  = 20) I had a long discussion with Anastasiia regarding the differential diagnosis and management of cystic adnexal lesions, including benign, borderline and malignant etiologies. Based on the size and complex and persistent nature of the lesion on imaging, surgical resection is reasonable. The other option is continued close radiographic followup.   She is a candidate for an attempted minimally invasive approach with robotic-assisted laparoscopic RSO, possible staging with hysterectomy. Rationale for concurrent D&C was discussed. The nature of the procedure was described in detail using diagrams. The risk of conversion to laparotomy for various indications was discussed. I explained that the risk of adhesions, resultant complications and/or laparotomy is elevated due to her previous surgery. The limitations of frozen section diagnosis were discussed. Risks of surgery were discussed in detail, including but not limited to bleeding, blood transfusion, infection, injury to adjacent organs and the potential need for further procedures in the future to treat various postoperative complications. The potential need for postoperative adjuvant therapy in the event of a malignancy was discussed. Perioperative and recuperative issues were addressed. She expressed her understanding of all of these issues.  All questions were answered to her apparent satisfaction. She plans to have the MRI and then decide re: surgery.

## 2024-06-21 NOTE — HISTORY OF PRESENT ILLNESS
[FreeTextEntry1] :  Ms. AGUAYO is a 73-year-old  referred by Dr. Martha Gregg for an opinion re: surgery for a complex right ovarian cyst and uterine fibroids. She has been followed by her GYN, Dr. Carroll as well as Dr. Gregg (same practice). She underwent open LSO at age 15 for a benign cyst by Dr. Memo Poole, via Pfannenstiel. She subsequently had an open BTL after completing childbearing. Denies any other abdominal surgery.  She noticed some increased symptoms in the past few months- lower pelvic cramping and dyspareunia - she is therefore feeling more inclined to have surgery, whereas in the past she has chosen observation.  Her last [facility] exam was in 2023. In the past she had PMB in  and an EMB obtained only benign endocervical tissue.  She has seen Dr. Gates 10/11/2023 - at that time ALEXIA (1.86) was low-risk;  previous u/s dated  was reviewed - no surgery was recommended due to patient being asymptomatic F/u sono was suggested in 6 months with Dr. Carroll.   Pelvic US- 2024 [facility]   Uterus:  L :   6.18   W:   6.09   H:  4.28 Volume 46.4 Cervix excluded                Anteverted/Heterogeneous Myomas: Site                                        L(cm)                W(cm)                      D(cm)                            Location Anterior                                  2.31                   2.02                         1.85                              Sub serosal Anterior rt.                              3.03                   2.31                         2.71                              Intramural Anterior left                            3.5                      2.67                        2.67                              Intramural   Endometrial Lining- 2.7 mm   Cervix - normal appearance   LTO - Oophorectomy   RTO - Difficult to discern   Morphology - cyst   Type - Simple Septated   Size  (cm)-  L: 7.51   W: 5.56       H:   4.52   Volume (ml):   98.82 Comment: Small calcification on the left posterior wall measuring 5 mm with shadowing RESULT: TA & TV ultrasounds completed for fibroids and a right ovarian cyst. Anteverted heterogeneous uterus with calcified fibroids essentially unchanged with the largest measuring 3.5 cm. The endometrium measures 2.7 mm. The right ovary is difficult to discern however a septated cyst measuring 7.5 cm x 5.6 cm x 4.5 cm (essentially unchanged) with a small calcification measuring 5mm is noted. The left ovary was surgically removed. No free fluid. Impression: A stable normal sized fibroid uterus is seen. Overall dimensions are stable compared to the previous study.  The previously noted septated right ovarian cyst is again seen . Overall dimensions are stable compared to the previous study. Recommendations: Clinical correlation and management is recommended. Follow up as clinically indicated.   Prior imaging:  MRI- 8/3/2020 [facility] Findings: Uterus: 8.6 x 4.8 x 6.3 cm. anteverted. Multiple heterogeneously enhancing fibroids. An intramural left lateral fibroid measures 3.6 x 3.2 x 2.4 cm. An anterior subserosal fibroid measures 1.4 x 1.2 x 1.5 cm. Endometrium: distorted by multiple fibroids. Visualized endometrium in the lower uterine segment measures 3 mm in thickness. Junctional Zone: WNL  Right Ovary: 6.7 x 6.1 x 4.0 cm cystic lesion in the right adnexa with thin septation corresponds to the ultrasound findings. No enhancing mural nodularity. Left ovary: Not visualized Bladder: WNL Lymph nodes: No pelvic lymphadenopathy Visualized portions: Abdominal organs, bowel, peritoneum vessels, abdominal wall bones: all WNL, no ascites Impression: Redemonstration of a 6.7 cm right adenxal cystic lesion with thin septation. No enhancing mural nodularity. Fibroid uterus.   Biopsy: 5/15/2020 Endocervical (curettage) [facility] Scant benign atrophic squamous epithelium and benign endocervical tissue.  Tumor Markers:                               10/11/2023                                2020 ALEXIA                   1.86 (WNL)                               1.93                   20.5                                          19.5 HE4                       69.0                                          74.5    PMHx Aneurysm, arthritis, bursitis, ocular migraines PSHx: open left salpingo-oophorectomy, D&C, tonsils, BTL, Cataract, TOP,  bilateral hand surgery Family hx of cancer: Pancreatic MGM, malignant melanoma Mother (90 A&W) , Colon Cancer father 94 - ;  no genetic testing was performed Social hx: , never a smoker, retired, sexually active, came alone to visit   Patient c/o intermittent lower pelvic cramping, bloating and dyspareunia. Denies change in bowel or urinary or recent weight changes.  She denies nausea/vomiting.  She denies vaginal spotting since  and denies vaginal discharge.   She denies all other associated signs and symptoms.   She is here alone today to discuss further management.    HM: Pap 2023 neg/ no HPV performed;  no hx abn pap smears Mammo/Breast U/S: 2024 BIRADS 2 Colonoscopy:   Ref: Dr. Chaitanya Carroll/Martha Gregg PCP: Dr. Raul Ariza GI: Dr. Coleman Neuro: Dr. Butterfield

## 2024-06-21 NOTE — PHYSICAL EXAM
[Chaperone Present] : A chaperone was present in the examining room during all aspects of the physical examination [[id number]] : A chaperone was present during the pelvic exam. [Normal] : Recto-Vaginal Exam: Normal [FreeTextEntry2] : Milagros [de-identified] : pfacarrolltiel scar [de-identified] : uterus mobile, deiated anteriorly by cystic ovary [de-identified] : RV septum clear

## 2024-06-21 NOTE — ASSESSMENT
[FreeTextEntry1] : 74y/o with a persistent complex right ovarian cyst, mild symptoms, considering surgery.

## 2024-06-24 ENCOUNTER — APPOINTMENT (OUTPATIENT)
Dept: PHYSICAL MEDICINE AND REHAB | Facility: CLINIC | Age: 74
End: 2024-06-24

## 2024-07-02 ENCOUNTER — APPOINTMENT (OUTPATIENT)
Dept: MRI IMAGING | Facility: CLINIC | Age: 74
End: 2024-07-02
Payer: MEDICARE

## 2024-07-02 ENCOUNTER — OUTPATIENT (OUTPATIENT)
Dept: OUTPATIENT SERVICES | Facility: HOSPITAL | Age: 74
LOS: 1 days | End: 2024-07-02
Payer: MEDICARE

## 2024-07-02 DIAGNOSIS — N83.201 UNSPECIFIED OVARIAN CYST, RIGHT SIDE: ICD-10-CM

## 2024-07-02 PROCEDURE — 72197 MRI PELVIS W/O & W/DYE: CPT

## 2024-07-02 PROCEDURE — 72197 MRI PELVIS W/O & W/DYE: CPT | Mod: 26,MH

## 2024-07-04 ENCOUNTER — NON-APPOINTMENT (OUTPATIENT)
Age: 74
End: 2024-07-04

## 2024-08-05 ENCOUNTER — OFFICE (OUTPATIENT)
Dept: URBAN - METROPOLITAN AREA CLINIC 12 | Facility: CLINIC | Age: 74
Setting detail: OPHTHALMOLOGY
End: 2024-08-05
Payer: MEDICARE

## 2024-08-05 ENCOUNTER — APPOINTMENT (OUTPATIENT)
Dept: OBGYN | Facility: CLINIC | Age: 74
End: 2024-08-05

## 2024-08-05 DIAGNOSIS — H35.363: ICD-10-CM

## 2024-08-05 DIAGNOSIS — H43.813: ICD-10-CM

## 2024-08-05 DIAGNOSIS — H43.393: ICD-10-CM

## 2024-08-05 DIAGNOSIS — H01.001: ICD-10-CM

## 2024-08-05 DIAGNOSIS — H16.223: ICD-10-CM

## 2024-08-05 DIAGNOSIS — Z96.1: ICD-10-CM

## 2024-08-05 DIAGNOSIS — G43.109: ICD-10-CM

## 2024-08-05 DIAGNOSIS — H01.004: ICD-10-CM

## 2024-08-05 PROBLEM — N83.209 OVARIAN CYST: Status: ACTIVE | Noted: 2024-08-05

## 2024-08-05 PROBLEM — N76.0 ACUTE VAGINITIS: Status: ACTIVE | Noted: 2021-11-18

## 2024-08-05 PROCEDURE — 92014 COMPRE OPH EXAM EST PT 1/>: CPT | Performed by: OPHTHALMOLOGY

## 2024-08-05 PROCEDURE — 92134 CPTRZ OPH DX IMG PST SGM RTA: CPT | Performed by: OPHTHALMOLOGY

## 2024-08-05 PROCEDURE — 99459 PELVIC EXAMINATION: CPT

## 2024-08-05 PROCEDURE — 99214 OFFICE O/P EST MOD 30 MIN: CPT

## 2024-08-05 ASSESSMENT — CONFRONTATIONAL VISUAL FIELD TEST (CVF)
OS_FINDINGS: FULL
OD_FINDINGS: FULL

## 2024-08-05 ASSESSMENT — LID EXAM ASSESSMENTS
OD_BLEPHARITIS: RUL 1+
OS_BLEPHARITIS: LUL 1+

## 2024-08-06 NOTE — PHYSICAL EXAM
[___] : [unfilled] ~Ucm mass on the right [Chaperone Present] : A chaperone was present in the examining room during all aspects of the physical examination [[id number]] : A chaperone was present during the pelvic exam. [Appropriately responsive] : appropriately responsive [Alert] : alert [No Acute Distress] : no acute distress [Soft] : soft [Non-tender] : non-tender [Oriented x3] : oriented x3 [Labia Majora] : normal [Labia Minora] : normal [No Bleeding] : There was no active vaginal bleeding [Normal] : normal [Uterine Adnexae] : normal  [FreeTextEntry2] : MARY KATE [FreeTextEntry4] : no dischrage  [FreeTextEntry6] : adnexa and fibroid uterus mobile

## 2024-08-06 NOTE — PHYSICAL EXAM
[___] : [unfilled] ~Ucm mass on the right [Chaperone Present] : A chaperone was present in the examining room during all aspects of the physical examination [[id number]] : A chaperone was present during the pelvic exam. [Appropriately responsive] : appropriately responsive [No Acute Distress] : no acute distress [Alert] : alert [Soft] : soft [Non-tender] : non-tender [Oriented x3] : oriented x3 [Labia Majora] : normal [Labia Minora] : normal [No Bleeding] : There was no active vaginal bleeding [Normal] : normal [Uterine Adnexae] : normal  [FreeTextEntry2] : MARY KATE [FreeTextEntry4] : no dischrage  [FreeTextEntry6] : adnexa and fibroid uterus mobile

## 2024-08-06 NOTE — HISTORY OF PRESENT ILLNESS
[FreeTextEntry1] : HPI   73 y/o c/o of suprapubic pain  and frequent urination  onset 3 days small amount of void went to [facility], given azo otc no discharge  sex painful over the last 3 days  hx of fibroids   Recently Went on Vacation hx of recurrent UTI 2 years ago, resolved on probiotic   hx of gastric ulcers    LMP:  post-menopausal   Last pap:  neg cytology  Last MM2024 birad 2   ALL: Latex, NKDA --------------------------------------------------------------------------------------------------------- ASSESSMENT & PLAN:    73 y/o  #r/o UTI # r/o vaginitis  -ucx - affirm -bactrim   #RO 7 cm cyst #fibroid uterus  - and  pelvic MRI show a stable large cyst -con't care w/ gyn onc; patient plans to schedule gyn/onc f/u appointment  rto 24 annual    Dr. Marizol Gardner DO, MPH, FACOG

## 2024-08-12 ENCOUNTER — APPOINTMENT (OUTPATIENT)
Dept: OBGYN | Facility: CLINIC | Age: 74
End: 2024-08-12

## 2024-08-12 VITALS
HEIGHT: 64 IN | DIASTOLIC BLOOD PRESSURE: 76 MMHG | SYSTOLIC BLOOD PRESSURE: 120 MMHG | BODY MASS INDEX: 22.2 KG/M2 | WEIGHT: 130 LBS

## 2024-08-12 DIAGNOSIS — R39.9 UNSPECIFIED SYMPTOMS AND SIGNS INVOLVING THE GENITOURINARY SYSTEM: ICD-10-CM

## 2024-08-12 DIAGNOSIS — Z13.820 ENCOUNTER FOR SCREENING FOR OSTEOPOROSIS: ICD-10-CM

## 2024-08-12 DIAGNOSIS — Z01.419 ENCOUNTER FOR GYNECOLOGICAL EXAMINATION (GENERAL) (ROUTINE) W/OUT ABNORMAL FINDINGS: ICD-10-CM

## 2024-08-12 LAB
APPEARANCE: CLEAR
BILIRUBIN URINE: NEGATIVE
BLOOD URINE: NEGATIVE
COLOR: YELLOW
GLUCOSE QUALITATIVE U: NEGATIVE
KETONES URINE: NEGATIVE
LEUKOCYTE ESTERASE URINE: NEGATIVE
NITRITE URINE: NEGATIVE
PH URINE: 6
PROTEIN URINE: NEGATIVE
SPECIFIC GRAVITY URINE: 1.02
UROBILINOGEN URINE: 0.2 (ref 0.2–?)

## 2024-08-12 PROCEDURE — 99397 PER PM REEVAL EST PAT 65+ YR: CPT | Mod: GY

## 2024-08-12 PROCEDURE — G0101: CPT

## 2024-08-12 RX ORDER — SUMATRIPTAN 20 MG/1
SPRAY NASAL
Refills: 0 | Status: ACTIVE | COMMUNITY

## 2024-08-12 RX ORDER — MAGNESIUM OXIDE/MAG AA CHELATE 300 MG
CAPSULE ORAL
Refills: 0 | Status: ACTIVE | COMMUNITY

## 2024-08-13 NOTE — COUNSELING
[Nutrition/ Exercise/ Weight Management] : nutrition, exercise, weight management [Vitamins/Supplements] : vitamins/supplements [Breast Self Exam] : breast self exam [Contraception/ Emergency Contraception/ Safe Sexual Practices] : contraception, emergency contraception, safe sexual practices [Lab Results] : lab results [Medication Management] : medication management Tyelnol #3/prescription called to pharmacy

## 2024-08-13 NOTE — DISCUSSION/SUMMARY
[FreeTextEntry1] :   The benefits of adequate calcium intake and a daily multivitamin along with routine daily cardiovascular exercise were reviewed with the patient.   The patient was informed regarding the benefits of a screening colonoscopy.   The importance of safer-sex was discussed with the patient. Repeat urine culture sent as she still has symptoms.  We reviewed ASCCP/ACOG guidelines for pap smears. Does not need after age 65 based on guidelines.

## 2024-08-13 NOTE — HISTORY OF PRESENT ILLNESS
[No] : Patient does not have concerns regarding sex [Currently Active] : currently active [Patient refuses STI testing] : Patient refuses STI testing [FreeTextEntry1] : Anastasiia is a  LMP  who presents for annual exam. She is without complaints. Denies pelvic pain, abnormal bleeding, or vaginal discharge. Denies issues with bowel or bladder function.  Treated for UTI last week but still has symptoms.  She is sexually active with male partner (s). Has pain during intercourse, deeper inside. Denies issues with dryness.  Lives with family. Works as retired. Feels safe at home. Denies depression/abuse.  Twin boys and 3 grandchildren. has an adopted daughter  Exercise is occasional due to knee pain, but walks regularly. Denies TAD  Planning to have surgery with Dr. Garvey for right ovarian cyst. She has had left tube and ovary removed in the past.    mammogram was performed in april  colonoscopy was recently perfromed

## 2024-08-13 NOTE — PHYSICAL EXAM
[Chaperone Present] : A chaperone was present in the examining room during all aspects of the physical examination [96699] : A chaperone was present during the pelvic exam. [Appropriately responsive] : appropriately responsive [Alert] : alert [No Acute Distress] : no acute distress [No Lymphadenopathy] : no lymphadenopathy [Soft] : soft [Non-tender] : non-tender [Non-distended] : non-distended [Oriented x3] : oriented x3 [Examination Of The Breasts] : a normal appearance [No Discharge] : no discharge [No Masses] : no breast masses were palpable [Labia Majora] : normal [Labia Minora] : normal [No Bleeding] : There was no active vaginal bleeding [Normal] : normal [Uterine Adnexae] : non-palpable [FreeTextEntry2] : shanon

## 2024-08-13 NOTE — PHYSICAL EXAM
[Chaperone Present] : A chaperone was present in the examining room during all aspects of the physical examination [08022] : A chaperone was present during the pelvic exam. [Appropriately responsive] : appropriately responsive [Alert] : alert [No Acute Distress] : no acute distress [No Lymphadenopathy] : no lymphadenopathy [Soft] : soft [Non-tender] : non-tender [Non-distended] : non-distended [Oriented x3] : oriented x3 [Examination Of The Breasts] : a normal appearance [No Discharge] : no discharge [No Masses] : no breast masses were palpable [Labia Majora] : normal [Labia Minora] : normal [No Bleeding] : There was no active vaginal bleeding [Normal] : normal [Uterine Adnexae] : non-palpable [FreeTextEntry2] : shanon

## 2024-08-13 NOTE — COUNSELING
[Nutrition/ Exercise/ Weight Management] : nutrition, exercise, weight management [Vitamins/Supplements] : vitamins/supplements [Breast Self Exam] : breast self exam [Contraception/ Emergency Contraception/ Safe Sexual Practices] : contraception, emergency contraception, safe sexual practices [Lab Results] : lab results [Medication Management] : medication management

## 2024-08-15 LAB — BACTERIA UR CULT: NORMAL

## 2024-08-19 ENCOUNTER — NON-APPOINTMENT (OUTPATIENT)
Age: 74
End: 2024-08-19

## 2024-08-24 ENCOUNTER — NON-APPOINTMENT (OUTPATIENT)
Age: 74
End: 2024-08-24

## 2024-08-30 ENCOUNTER — NON-APPOINTMENT (OUTPATIENT)
Age: 74
End: 2024-08-30

## 2024-09-03 ENCOUNTER — NON-APPOINTMENT (OUTPATIENT)
Age: 74
End: 2024-09-03

## 2024-09-03 ENCOUNTER — OUTPATIENT (OUTPATIENT)
Dept: OUTPATIENT SERVICES | Facility: HOSPITAL | Age: 74
LOS: 1 days | End: 2024-09-03

## 2024-09-03 VITALS
HEART RATE: 78 BPM | HEIGHT: 63 IN | RESPIRATION RATE: 16 BRPM | DIASTOLIC BLOOD PRESSURE: 78 MMHG | OXYGEN SATURATION: 98 % | SYSTOLIC BLOOD PRESSURE: 124 MMHG | TEMPERATURE: 99 F | WEIGHT: 125 LBS

## 2024-09-03 DIAGNOSIS — Z98.890 OTHER SPECIFIED POSTPROCEDURAL STATES: Chronic | ICD-10-CM

## 2024-09-03 DIAGNOSIS — N83.201 UNSPECIFIED OVARIAN CYST, RIGHT SIDE: ICD-10-CM

## 2024-09-03 DIAGNOSIS — I72.0 ANEURYSM OF CAROTID ARTERY: ICD-10-CM

## 2024-09-03 LAB
A1C WITH ESTIMATED AVERAGE GLUCOSE RESULT: 5.4 % — SIGNIFICANT CHANGE UP (ref 4–5.6)
ANION GAP SERPL CALC-SCNC: 13 MMOL/L — SIGNIFICANT CHANGE UP (ref 7–14)
BLD GP AB SCN SERPL QL: NEGATIVE — SIGNIFICANT CHANGE UP
BUN SERPL-MCNC: 22 MG/DL — SIGNIFICANT CHANGE UP (ref 7–23)
CALCIUM SERPL-MCNC: 9.7 MG/DL — SIGNIFICANT CHANGE UP (ref 8.4–10.5)
CHLORIDE SERPL-SCNC: 103 MMOL/L — SIGNIFICANT CHANGE UP (ref 98–107)
CO2 SERPL-SCNC: 25 MMOL/L — SIGNIFICANT CHANGE UP (ref 22–31)
CREAT SERPL-MCNC: 0.68 MG/DL — SIGNIFICANT CHANGE UP (ref 0.5–1.3)
EGFR: 91 ML/MIN/1.73M2 — SIGNIFICANT CHANGE UP
ESTIMATED AVERAGE GLUCOSE: 108 — SIGNIFICANT CHANGE UP
GLUCOSE SERPL-MCNC: 85 MG/DL — SIGNIFICANT CHANGE UP (ref 70–99)
POTASSIUM SERPL-MCNC: 4.2 MMOL/L — SIGNIFICANT CHANGE UP (ref 3.5–5.3)
POTASSIUM SERPL-SCNC: 4.2 MMOL/L — SIGNIFICANT CHANGE UP (ref 3.5–5.3)
RH IG SCN BLD-IMP: POSITIVE — SIGNIFICANT CHANGE UP
RH IG SCN BLD-IMP: POSITIVE — SIGNIFICANT CHANGE UP
SODIUM SERPL-SCNC: 141 MMOL/L — SIGNIFICANT CHANGE UP (ref 135–145)

## 2024-09-03 RX ORDER — CYCLOSPORINE 0.05 %
1 DROPPERETTE, SINGLE-USE DROP DISPENSER OPHTHALMIC (EYE)
Refills: 0 | DISCHARGE

## 2024-09-03 NOTE — H&P PST ADULT - FUNCTIONAL STATUS
Pleasant 50 Mets Dasi score 6.05 Walks 1 to 2 blocks, house chores, climbs 1 flight of stairs, ADLs/4-10 METS

## 2024-09-03 NOTE — H&P PST ADULT - NSANTHOSAYNRD_GEN_A_CORE
No. COOKIE screening performed.  STOP BANG Legend: 0-2 = LOW Risk; 3-4 = INTERMEDIATE Risk; 5-8 = HIGH Risk

## 2024-09-03 NOTE — H&P PST ADULT - CARDIOVASCULAR COMMENTS
hx of  aneurysm of right common carotid artery - (stable small right cavernous carotid artery, last MRI  05/15/24 )  - follows neurologist .

## 2024-09-03 NOTE — H&P PST ADULT - PROBLEM SELECTOR PLAN 1
Patient tentatively scheduled for  robotic right salpingo-oophorectomy , dilation and curettage , possible hysterectomy, staging , laparotomy , cystoscopy on 09/06/2024.  Pre-op instructions provided. Pt given verbal and written instructions with teach back on chlorhexidine wash  and pepcid. Pt verbalized understanding with return demonstration.  Labs done.  UA/ Urine culture done on 08/12/24 - Copy of results in chart.

## 2024-09-03 NOTE — H&P PST ADULT - GENERAL COMMENTS
Pt stated she tested positive for covid on 8/24-completed Paxlovid . Pt stated she tested positive for covid on 8/24. Her surgery was rescheduled from 8/30/ 24 to 9/6/24. Currently pt is asymptomatic and covid negative since 08/31

## 2024-09-03 NOTE — H&P PST ADULT - PROBLEM SELECTOR PLAN 3
DELIRIUM SCREENING   1-Patient eligible for deepak risk screen age>75? YES  2-Health care proxy paperwork given to patient? Yes, Health care proxy form provided and explained  3-Impaired mobility (ie: uses cane, walker, wheelchair, or assist device)?   4-Known dementia diagnosis?   5-Impaired functional status (METS<4)?   6-Malnutrition BMI<20? DELIRIUM SCREENING   1-Patient eligible for deepak risk screen age>75? NO  2-Health care proxy paperwork given to patient? Yes, Health care proxy form provided and explained  3-Impaired mobility (ie: uses cane, walker, wheelchair, or assist device)?   4-Known dementia diagnosis?   5-Impaired functional status (METS<4)?   6-Malnutrition BMI<20?

## 2024-09-03 NOTE — H&P PST ADULT - PROBLEM SELECTOR PLAN 2
Pt reports she was c/o neck pain 2021 and MRI 03/08/2021  showed approximately 2mm aneurysm  of proximal right cavernous carotid artery . Pt had recent MRA head done which showed stable small right cavernous carotid artery aneurysm .Copy of  MRI  results enclosed in chart.  Pt follows neurologist.

## 2024-09-03 NOTE — H&P PST ADULT - HISTORY OF PRESENT ILLNESS
74 year old female with hx of migraine, GERD , Aneurysm of right common carotid artery (stable small right cavernous carotid artery , last MRI  05/15/24 ) c/o lower pelvic cramping and dyspareunia since last few months. Imaging showed complex right ovarian cyst and uterine fibroids. Pt presents to PST with pre op dx of unspecified ovarian cyst, right side is scheduled for robotic right salpingo-oophorectomy , dilation and curettage , possible hysterectomy, staging , laparotomy , cystoscopy.  *****Pt stated she tested positive for covid on 8/24- completed Paxlovid dose . Her surgery was rescheduled from 8/30/24 to 9/6/24. Currently pt is asymptomatic and covid negative since 08/31/24.

## 2024-09-03 NOTE — H&P PST ADULT - NSICDXPASTMEDICALHX_GEN_ALL_CORE_FT
PAST MEDICAL HISTORY:  2019 novel coronavirus disease (COVID-19)     Aneurysm of right common carotid artery     GERD (gastroesophageal reflux disease)     H/O migraine     Hemorrhoids     History of TMJ disorder     Lumbar herniated disc     Prediabetes     Unspecified ovarian cyst, right side

## 2024-09-03 NOTE — H&P PST ADULT - GENITOURINARY COMMENTS
Pt reports hx of  ovarian cyst, right side and uterine fibroids since last few months. Pre op dx- Unspecified ovarian cyst, right side

## 2024-09-04 LAB
BASOPHILS # BLD AUTO: 0.04 K/UL — SIGNIFICANT CHANGE UP (ref 0–0.2)
BASOPHILS NFR BLD AUTO: 0.7 % — SIGNIFICANT CHANGE UP (ref 0–2)
EOSINOPHIL # BLD AUTO: 0.16 K/UL — SIGNIFICANT CHANGE UP (ref 0–0.5)
EOSINOPHIL NFR BLD AUTO: 2.9 % — SIGNIFICANT CHANGE UP (ref 0–6)
HCT VFR BLD CALC: 48.2 % — HIGH (ref 34.5–45)
HGB BLD-MCNC: 14.7 G/DL — SIGNIFICANT CHANGE UP (ref 11.5–15.5)
IMM GRANULOCYTES NFR BLD AUTO: 0.4 % — SIGNIFICANT CHANGE UP (ref 0–0.9)
LYMPHOCYTES # BLD AUTO: 1.57 K/UL — SIGNIFICANT CHANGE UP (ref 1–3.3)
LYMPHOCYTES # BLD AUTO: 28.4 % — SIGNIFICANT CHANGE UP (ref 13–44)
MCHC RBC-ENTMCNC: 29.5 PG — SIGNIFICANT CHANGE UP (ref 27–34)
MCHC RBC-ENTMCNC: 30.5 GM/DL — LOW (ref 32–36)
MCV RBC AUTO: 96.8 FL — SIGNIFICANT CHANGE UP (ref 80–100)
MONOCYTES # BLD AUTO: 0.34 K/UL — SIGNIFICANT CHANGE UP (ref 0–0.9)
MONOCYTES NFR BLD AUTO: 6.1 % — SIGNIFICANT CHANGE UP (ref 2–14)
NEUTROPHILS # BLD AUTO: 3.4 K/UL — SIGNIFICANT CHANGE UP (ref 1.8–7.4)
NEUTROPHILS NFR BLD AUTO: 61.5 % — SIGNIFICANT CHANGE UP (ref 43–77)
PLATELET # BLD AUTO: 398 K/UL — SIGNIFICANT CHANGE UP (ref 150–400)
RBC # BLD: 4.98 M/UL — SIGNIFICANT CHANGE UP (ref 3.8–5.2)
RBC # FLD: 13.8 % — SIGNIFICANT CHANGE UP (ref 10.3–14.5)
WBC # BLD: 5.53 K/UL — SIGNIFICANT CHANGE UP (ref 3.8–10.5)
WBC # FLD AUTO: 5.53 K/UL — SIGNIFICANT CHANGE UP (ref 3.8–10.5)

## 2024-09-05 ENCOUNTER — APPOINTMENT (OUTPATIENT)
Dept: ANTEPARTUM | Facility: CLINIC | Age: 74
End: 2024-09-05

## 2024-09-05 ENCOUNTER — APPOINTMENT (OUTPATIENT)
Dept: RADIOLOGY | Facility: CLINIC | Age: 74
End: 2024-09-05
Payer: MEDICARE

## 2024-09-05 ENCOUNTER — TRANSCRIPTION ENCOUNTER (OUTPATIENT)
Age: 74
End: 2024-09-05

## 2024-09-05 ENCOUNTER — OUTPATIENT (OUTPATIENT)
Dept: OUTPATIENT SERVICES | Facility: HOSPITAL | Age: 74
LOS: 1 days | End: 2024-09-05
Payer: MEDICARE

## 2024-09-05 ENCOUNTER — APPOINTMENT (OUTPATIENT)
Dept: OBGYN | Facility: CLINIC | Age: 74
End: 2024-09-05

## 2024-09-05 DIAGNOSIS — Z13.820 ENCOUNTER FOR SCREENING FOR OSTEOPOROSIS: ICD-10-CM

## 2024-09-05 PROBLEM — M51.26 OTHER INTERVERTEBRAL DISC DISPLACEMENT, LUMBAR REGION: Chronic | Status: ACTIVE | Noted: 2024-09-03

## 2024-09-05 PROBLEM — U07.1 COVID-19: Chronic | Status: ACTIVE | Noted: 2024-09-03

## 2024-09-05 PROBLEM — K21.9 GASTRO-ESOPHAGEAL REFLUX DISEASE WITHOUT ESOPHAGITIS: Chronic | Status: ACTIVE | Noted: 2024-09-03

## 2024-09-05 PROBLEM — I72.0 ANEURYSM OF CAROTID ARTERY: Chronic | Status: ACTIVE | Noted: 2024-09-03

## 2024-09-05 PROCEDURE — 77085 DXA BONE DENSITY AXL VRT FX: CPT | Mod: 26

## 2024-09-05 PROCEDURE — 77085 DXA BONE DENSITY AXL VRT FX: CPT

## 2024-09-06 ENCOUNTER — OUTPATIENT (OUTPATIENT)
Dept: INPATIENT UNIT | Facility: HOSPITAL | Age: 74
LOS: 1 days | Discharge: HOME | End: 2024-09-06
Payer: MEDICARE

## 2024-09-06 ENCOUNTER — APPOINTMENT (OUTPATIENT)
Dept: GYNECOLOGIC ONCOLOGY | Facility: HOSPITAL | Age: 74
End: 2024-09-06

## 2024-09-06 ENCOUNTER — TRANSCRIPTION ENCOUNTER (OUTPATIENT)
Age: 74
End: 2024-09-06

## 2024-09-06 ENCOUNTER — RESULT REVIEW (OUTPATIENT)
Age: 74
End: 2024-09-06

## 2024-09-06 VITALS
RESPIRATION RATE: 16 BRPM | TEMPERATURE: 98 F | OXYGEN SATURATION: 99 % | SYSTOLIC BLOOD PRESSURE: 115 MMHG | DIASTOLIC BLOOD PRESSURE: 59 MMHG | HEART RATE: 68 BPM

## 2024-09-06 VITALS
SYSTOLIC BLOOD PRESSURE: 142 MMHG | HEART RATE: 71 BPM | TEMPERATURE: 98 F | RESPIRATION RATE: 17 BRPM | WEIGHT: 125 LBS | DIASTOLIC BLOOD PRESSURE: 69 MMHG | HEIGHT: 63 IN

## 2024-09-06 DIAGNOSIS — Z98.890 OTHER SPECIFIED POSTPROCEDURAL STATES: Chronic | ICD-10-CM

## 2024-09-06 DIAGNOSIS — N83.201 UNSPECIFIED OVARIAN CYST, RIGHT SIDE: ICD-10-CM

## 2024-09-06 LAB — GLUCOSE BLDC GLUCOMTR-MCNC: 97 MG/DL — SIGNIFICANT CHANGE UP (ref 70–99)

## 2024-09-06 PROCEDURE — 58661 LAPAROSCOPY REMOVE ADNEXA: CPT | Mod: RT

## 2024-09-06 PROCEDURE — 58120 DILATION AND CURETTAGE: CPT

## 2024-09-06 PROCEDURE — 88305 TISSUE EXAM BY PATHOLOGIST: CPT | Mod: 26

## 2024-09-06 PROCEDURE — 88112 CYTOPATH CELL ENHANCE TECH: CPT | Mod: 26

## 2024-09-06 PROCEDURE — S2900 ROBOTIC SURGICAL SYSTEM: CPT | Mod: NC

## 2024-09-06 PROCEDURE — 50715 RELEASE OF URETER: CPT | Mod: RT,59

## 2024-09-06 RX ORDER — PANTOPRAZOLE SODIUM 40 MG
1 TABLET, DELAYED RELEASE (ENTERIC COATED) ORAL
Refills: 0 | DISCHARGE

## 2024-09-06 RX ORDER — SUMATRIPTAN SUCCINATE 100 MG/1
20 TABLET ORAL
Refills: 0 | DISCHARGE

## 2024-09-06 RX ORDER — OXYCODONE HYDROCHLORIDE 5 MG/1
1 TABLET ORAL
Qty: 8 | Refills: 0
Start: 2024-09-06

## 2024-09-06 RX ORDER — ONDANSETRON 2 MG/ML
4 INJECTION, SOLUTION INTRAMUSCULAR; INTRAVENOUS ONCE
Refills: 0 | Status: ACTIVE | OUTPATIENT
Start: 2024-09-06 | End: 2025-08-05

## 2024-09-06 RX ORDER — PSYLLIUM HUSK 3.4 G/6.5G
2 GRANULE ORAL
Refills: 0 | DISCHARGE

## 2024-09-06 RX ORDER — CHLORHEXIDINE GLUCONATE 40 MG/ML
1 SOLUTION TOPICAL ONCE
Refills: 0 | Status: DISCONTINUED | OUTPATIENT
Start: 2024-09-06 | End: 2024-09-06

## 2024-09-06 RX ORDER — HYDROMORPHONE HYDROCHLORIDE 2 MG/1
0.5 TABLET ORAL
Refills: 0 | Status: DISCONTINUED | OUTPATIENT
Start: 2024-09-06 | End: 2024-09-06

## 2024-09-06 RX ORDER — ACETAMINOPHEN 325 MG/1
2 TABLET ORAL
Qty: 0 | Refills: 0 | DISCHARGE

## 2024-09-06 RX ORDER — OXYCODONE HYDROCHLORIDE 5 MG/1
5 TABLET ORAL ONCE
Refills: 0 | Status: DISCONTINUED | OUTPATIENT
Start: 2024-09-06 | End: 2024-09-06

## 2024-09-06 RX ADMIN — OXYCODONE HYDROCHLORIDE 5 MILLIGRAM(S): 5 TABLET ORAL at 15:00

## 2024-09-06 RX ADMIN — HYDROMORPHONE HYDROCHLORIDE 0.5 MILLIGRAM(S): 2 TABLET ORAL at 12:00

## 2024-09-06 RX ADMIN — HYDROMORPHONE HYDROCHLORIDE 0.5 MILLIGRAM(S): 2 TABLET ORAL at 11:18

## 2024-09-06 NOTE — ASU DISCHARGE PLAN (ADULT/PEDIATRIC) - NURSING INSTRUCTIONS
Last dose of TYLENOL for pain management was at 10am Next dose of TYLENOL may be taken at or after 4pm if needed. DO NOT take any additional products containing TYLENOL or ACETAMINOPHEN, such as VICODIN, PERCOCET, NORCO, EXCEDRIN, and any over-the-counter cold medications. DO NOT CONSUME MORE THAN 3208-7176 MG OF TYLENOL (acetaminophen) in a 24-hour period. Next dose of NDAIDS (ibuprofen, motrin, advil, naproxen, aleve, or aspirin) may be taken at or after 4pm if needed.

## 2024-09-06 NOTE — BRIEF OPERATIVE NOTE - OPERATION/FINDINGS
On EUA grossly normal external genitalia, smooth cervix, small mobile uterus  On laparoscopy, atraumatic entry. Grossly normal omentum, bowel, liver edge, gallbladder, decompressed stomach. R ovarian cyst embedded in the R side wall. Uterus with multiple subserous and pedunculated fibroids. Aberrant vasculature noted on the R side wall.  Good hemostasis at end of procedure.

## 2024-09-06 NOTE — BRIEF OPERATIVE NOTE - NSICDXBRIEFPROCEDURE_GEN_ALL_CORE_FT
PROCEDURES:  Robot-assisted laparoscopic right salpingoopherectomy 06-Sep-2024 13:14:05 Robot-assisted Right laparoscopic salpingo-oophorectomy, R uterolysis, Dilation and Curettage Jenni Mcneal  Exam under anesthesia, pelvic 06-Sep-2024 13:20:16  Jenni Mcneal

## 2024-09-06 NOTE — CHART NOTE - NSCHARTNOTEFT_GEN_A_CORE
***Documentation Delayed Due to Clinical Duties***    GYN ONC POST-OP CHECK    S: Pt awake and alert resting comfortably in bed. Pain controlled.  Not OOB yet.    O:   T(C): 36.4 (09-06-24 @ 14:00), Max: 36.6 (09-06-24 @ 11:00)  HR: 70 (09-06-24 @ 14:45) (59 - 70)  BP: 116/64 (09-06-24 @ 14:30) (105/56 - 120/63)  RR: 18 (09-06-24 @ 14:45) (12 - 18)  SpO2: 98% (09-06-24 @ 14:45) (95% - 100%)  I&O's Summary    06 Sep 2024 07:01  -  06 Sep 2024 14:54  --------------------------------------------------------  IN: 800 mL / OUT: 300 mL / NET: 500 mL      Gen: NAD  Lungs: satting well on RA  Abd: soft, gently distended and appropriately tender.  Inc: 4 lsc incisions, shadowing on umbilical incision, dressing replaced, other incision Clean/dry/intact  Ext: SCDs in place    A/P: 74y POD#0 s/p EUA, RA RSO, R uterolysis, myomectomy for R ovarian cyst. EBL 20. Frozen Pathology= Benign. Patient progressing well post-operatively      1. Neuro: oxy prn, Tylenol   2. Card: Monitor VS  3. Pulm: Incentive spirometer use.  4. GI: Advance to regular diet. Anti-emetics PRN.  5. : DTV@6:30p   6. Electrolytes: LR@125cc/hr.   7. DVT ppx w/ SCDs while in bed. Early ambulation, initially with assistance then as tolerated.  8. Discharge from PACU when criteria met.     d/w GYN Onc team    Jenni Mcneal, PGY-2

## 2024-09-06 NOTE — ASU DISCHARGE PLAN (ADULT/PEDIATRIC) - CARE PROVIDER_API CALL
Chacha Garvey  Gynecologic Oncology  [address] [place] [id number]  Phone: [phone]  Fax: [phone]  Established Patient  Follow Up Time: 2 weeks

## 2024-09-06 NOTE — ASU PREOP CHECKLIST - LOOSE TEETH
no no breast lump R/no nipple discharge L/no nipple discharge R/no breast lump L/no breast tenderness L/no breast tenderness R

## 2024-09-07 PROCEDURE — 58661 LAPAROSCOPY REMOVE ADNEXA: CPT | Mod: AS,RT

## 2024-09-09 LAB — NON-GYNECOLOGICAL CYTOLOGY STUDY: SIGNIFICANT CHANGE UP

## 2024-09-12 ENCOUNTER — NON-APPOINTMENT (OUTPATIENT)
Age: 74
End: 2024-09-12

## 2024-09-17 LAB — SURGICAL PATHOLOGY STUDY: SIGNIFICANT CHANGE UP

## 2024-09-19 PROBLEM — Z86.69 PERSONAL HISTORY OF OTHER DISEASES OF THE NERVOUS SYSTEM AND SENSE ORGANS: Chronic | Status: ACTIVE | Noted: 2024-09-03

## 2024-09-19 PROBLEM — R73.03 PREDIABETES: Chronic | Status: ACTIVE | Noted: 2024-09-03

## 2024-09-23 ENCOUNTER — NON-APPOINTMENT (OUTPATIENT)
Age: 74
End: 2024-09-23

## 2024-09-24 ENCOUNTER — APPOINTMENT (OUTPATIENT)
Dept: GYNECOLOGIC ONCOLOGY | Facility: CLINIC | Age: 74
End: 2024-09-24
Payer: MEDICARE

## 2024-09-24 VITALS
OXYGEN SATURATION: 99 % | SYSTOLIC BLOOD PRESSURE: 129 MMHG | HEIGHT: 64 IN | DIASTOLIC BLOOD PRESSURE: 81 MMHG | BODY MASS INDEX: 21 KG/M2 | WEIGHT: 123 LBS | HEART RATE: 69 BPM | RESPIRATION RATE: 16 BRPM

## 2024-09-24 DIAGNOSIS — N83.299 OTHER OVARIAN CYST, UNSPECIFIED SIDE: ICD-10-CM

## 2024-09-24 DIAGNOSIS — N83.209 UNSPECIFIED OVARIAN CYST, UNSPECIFIED SIDE: ICD-10-CM

## 2024-09-24 DIAGNOSIS — N83.201 UNSPECIFIED OVARIAN CYST, RIGHT SIDE: ICD-10-CM

## 2024-09-24 DIAGNOSIS — D25.2 INTRAMURAL LEIOMYOMA OF UTERUS: ICD-10-CM

## 2024-09-24 DIAGNOSIS — D25.1 INTRAMURAL LEIOMYOMA OF UTERUS: ICD-10-CM

## 2024-09-24 PROCEDURE — 99212 OFFICE O/P EST SF 10 MIN: CPT

## 2024-09-24 NOTE — DISCUSSION/SUMMARY
[Clean] : was clean [Dry] : was dry [Intact] : was intact [Sutures Intact] : had sutures  intact [None] : had no drainage [Normal Skin] : normal appearance [Normal Skin Turgor] : normal skin turgor [Doing Well] : is doing well [Excellent Pain Control] : has excellent pain control [No Sign of Infection] : is showing no signs of infection [Reviewed] : reviewed [Erythema] : was not erythematous [Ecchymosis] : was not ecchymotic [Seroma] : had no seroma [Firm] : soft [Tender] : nontender [Rebound] : no rebound tenderness [Guarding] : no guarding [Incisional Hernia] : no incisional hernia [Mass] : no palpable mass [Cervical Abnormality] : normal cervix [External Genitalia Abnormal] : normal external genitalia [Vaginal Exam Abnormal] : normal vaginal exam [0] : 0 [de-identified] : TIRSO/ALVINA POLLOCKL [de-identified] : regular diet. continue to maintain pelvic rest x 4 more weeks. refrain from heavy lifting and strenuous exercise x 4 more weeks. [FreeTextEntry1] : 1. Endometrial curettings - Benign inactive endometrium.  - Benign squamous epithelium.  2. Right fallopian tube and ovary, salpingo-oophorectomy - Benign ovary showing a simple cyst. - Benign fallopian tube. - See comment.  3. Subserosal fibroid - Leiomyoma.

## 2024-09-24 NOTE — REASON FOR VISIT
[Post Op] : post op visit [de-identified] : 9/6/24 [de-identified] :  Robotic-assisted laparoscopic right salpingo-oophorectomy, dilation and curettage, lysis of adhesions, right ureterolysis, and robotic subserosal myomectomy. [de-identified] : She reports that she has had a very smooth recovery with no issues. She denies abnl vaginal bleeding, pelvic or abdominal pain or urinary or bowel complaints. No longer requiring pain medication. Returning to usual activities and maintaining pelvic rest.

## 2024-09-24 NOTE — ASSESSMENT
[FreeTextEntry1] : 73y/o s/p robotic RSO, D&C, subserosal myomectomy with benign pathology, healing well.   PLAN: Final pathology results were reviewed in detail with the patient and she was given a copy for her records. Instructions were reviewed.  She was advised that she to continue to follow up with her regular GYN group for routine gynecologic care. She was reminded to promptly report any vaginal bleeding. .  All questions were answered to her apparent satisfaction.

## 2024-09-24 NOTE — DISCUSSION/SUMMARY
[Clean] : was clean [Dry] : was dry [Intact] : was intact [Sutures Intact] : had sutures  intact [None] : had no drainage [Normal Skin] : normal appearance [Normal Skin Turgor] : normal skin turgor [Doing Well] : is doing well [Excellent Pain Control] : has excellent pain control [No Sign of Infection] : is showing no signs of infection [Reviewed] : reviewed [Erythema] : was not erythematous [Ecchymosis] : was not ecchymotic [Seroma] : had no seroma [Firm] : soft [Tender] : nontender [Rebound] : no rebound tenderness [Guarding] : no guarding [Incisional Hernia] : no incisional hernia [Mass] : no palpable mass [Cervical Abnormality] : normal cervix [External Genitalia Abnormal] : normal external genitalia [Vaginal Exam Abnormal] : normal vaginal exam [0] : 0 [de-identified] : TIRSO/ALVINA POLLOCKL [de-identified] : regular diet. continue to maintain pelvic rest x 4 more weeks. refrain from heavy lifting and strenuous exercise x 4 more weeks. [FreeTextEntry1] : 1. Endometrial curettings - Benign inactive endometrium.  - Benign squamous epithelium.  2. Right fallopian tube and ovary, salpingo-oophorectomy - Benign ovary showing a simple cyst. - Benign fallopian tube. - See comment.  3. Subserosal fibroid - Leiomyoma.

## 2024-09-24 NOTE — ASSESSMENT
[FreeTextEntry1] : 75y/o s/p robotic RSO, D&C, subserosal myomectomy with benign pathology, healing well.   PLAN: Final pathology results were reviewed in detail with the patient and she was given a copy for her records. Instructions were reviewed.  She was advised that she to continue to follow up with her regular GYN group for routine gynecologic care. She was reminded to promptly report any vaginal bleeding. .  All questions were answered to her apparent satisfaction.

## 2024-09-24 NOTE — REASON FOR VISIT
[Post Op] : post op visit [de-identified] : 9/6/24 [de-identified] :  Robotic-assisted laparoscopic right salpingo-oophorectomy, dilation and curettage, lysis of adhesions, right ureterolysis, and robotic subserosal myomectomy. [de-identified] : She reports that she has had a very smooth recovery with no issues. She denies abnl vaginal bleeding, pelvic or abdominal pain or urinary or bowel complaints. No longer requiring pain medication. Returning to usual activities and maintaining pelvic rest.

## 2025-08-06 ENCOUNTER — OFFICE (OUTPATIENT)
Dept: URBAN - METROPOLITAN AREA CLINIC 12 | Facility: CLINIC | Age: 75
Setting detail: OPHTHALMOLOGY
End: 2025-08-06
Payer: MEDICARE

## 2025-08-06 VITALS — HEIGHT: 55 IN

## 2025-08-06 DIAGNOSIS — H43.393: ICD-10-CM

## 2025-08-06 DIAGNOSIS — H16.223: ICD-10-CM

## 2025-08-06 DIAGNOSIS — H35.363: ICD-10-CM

## 2025-08-06 DIAGNOSIS — H43.813: ICD-10-CM

## 2025-08-06 DIAGNOSIS — H01.004: ICD-10-CM

## 2025-08-06 DIAGNOSIS — H01.001: ICD-10-CM

## 2025-08-06 PROCEDURE — 92250 FUNDUS PHOTOGRAPHY W/I&R: CPT | Performed by: OPHTHALMOLOGY

## 2025-08-06 PROCEDURE — 92014 COMPRE OPH EXAM EST PT 1/>: CPT | Performed by: OPHTHALMOLOGY

## 2025-08-06 ASSESSMENT — REFRACTION_CURRENTRX
OS_CYLINDER: -0.50
OD_AXIS: 114
OS_AXIS: 061
OS_AXIS: 065
OD_SPHERE: +1.00
OD_OVR_VA: 20/
OS_VPRISM_DIRECTION: SV
OD_CYLINDER: -0.50
OS_SPHERE: -0.50
OD_VPRISM_DIRECTION: SV
OS_OVR_VA: 20/
OD_OVR_VA: 20/
OS_SPHERE: +1.00
OS_CYLINDER: -0.50
OS_OVR_VA: 20/
OD_VPRISM_DIRECTION: SV
OD_SPHERE: -0.50
OS_VPRISM_DIRECTION: SV
OS_OVR_VA: 20/
OD_VPRISM_DIRECTION: SV
OS_SPHERE: -0.50
OS_VPRISM_DIRECTION: SV
OD_AXIS: 119
OD_CYLINDER: -0.50
OD_SPHERE: -0.50
OD_OVR_VA: 20/

## 2025-08-06 ASSESSMENT — REFRACTION_MANIFEST
OD_CYLINDER: -0.50
OD_SPHERE: PLANO
OD_AXIS: 120
OS_SPHERE: -0.25
OS_AXIS: 090
OD_AXIS: 120
OS_CYLINDER: -1.00
OD_AXIS: 120
OS_AXIS: 085
OS_VA1: 20/25-1
OD_SPHERE: +0.50
OS_CYLINDER: -0.50
OD_CYLINDER: -0.75
OD_VA1: 20/20-1
OS_CYLINDER: -0.75
OD_VA1: 20/20
OD_SPHERE: -0.25
OD_CYLINDER: -1.00
OS_SPHERE: -0.25
OS_SPHERE: -0.75
OS_AXIS: 086
OS_VA1: 20/NI

## 2025-08-06 ASSESSMENT — SUPERFICIAL PUNCTATE KERATITIS (SPK)
OD_SPK: 1+
OS_SPK: T

## 2025-08-06 ASSESSMENT — REFRACTION_AUTOREFRACTION
OS_CYLINDER: -1.00
OD_SPHERE: +0.50
OS_AXIS: 087
OD_CYLINDER: -1.00
OS_SPHERE: -0.25
OD_AXIS: 118

## 2025-08-06 ASSESSMENT — TONOMETRY
OS_IOP_MMHG: 12
OD_IOP_MMHG: 13

## 2025-08-06 ASSESSMENT — VISUAL ACUITY
OD_BCVA: 20/20
OS_BCVA: 20/25-1

## 2025-08-06 ASSESSMENT — KERATOMETRY
OD_K2POWER_DIOPTERS: 46.00
OS_AXISANGLE_DEGREES: 142
METHOD_AUTO_MANUAL: AUTO
OS_K1POWER_DIOPTERS: 46.00
OS_K2POWER_DIOPTERS: 46.25
OD_AXISANGLE_DEGREES: 047
OD_K1POWER_DIOPTERS: 45.50

## 2025-08-06 ASSESSMENT — CONFRONTATIONAL VISUAL FIELD TEST (CVF)
OD_FINDINGS: FULL
OS_FINDINGS: FULL

## 2025-08-06 ASSESSMENT — LID EXAM ASSESSMENTS
OD_BLEPHARITIS: RUL 1+
OS_BLEPHARITIS: LUL 1+

## 2025-08-06 ASSESSMENT — DRY EYES - PHYSICIAN NOTES: OD_GENERALCOMMENTS: SUPERIOR

## 2025-08-19 ENCOUNTER — APPOINTMENT (OUTPATIENT)
Dept: OBGYN | Facility: CLINIC | Age: 75
End: 2025-08-19

## 2025-08-19 VITALS
HEIGHT: 64 IN | SYSTOLIC BLOOD PRESSURE: 116 MMHG | DIASTOLIC BLOOD PRESSURE: 72 MMHG | WEIGHT: 125 LBS | BODY MASS INDEX: 21.34 KG/M2

## 2025-08-19 DIAGNOSIS — R92.30 DENSE BREASTS, UNSPECIFIED: ICD-10-CM

## 2025-08-19 DIAGNOSIS — N95.2 POSTMENOPAUSAL ATROPHIC VAGINITIS: ICD-10-CM

## 2025-08-19 DIAGNOSIS — Z12.39 ENCOUNTER FOR OTHER SCREENING FOR MALIGNANT NEOPLASM OF BREAST: ICD-10-CM

## 2025-08-19 DIAGNOSIS — Z01.419 ENCOUNTER FOR GYNECOLOGICAL EXAMINATION (GENERAL) (ROUTINE) W/OUT ABNORMAL FINDINGS: ICD-10-CM

## 2025-08-19 RX ORDER — ESTRADIOL 0.1 MG/G
0.1 CREAM VAGINAL
Qty: 1 | Refills: 2 | Status: ACTIVE | COMMUNITY
Start: 2025-08-19 | End: 1900-01-01

## 2025-08-26 LAB
CYTOLOGY CVX/VAG DOC THIN PREP: ABNORMAL
HPV HIGH+LOW RISK DNA PNL CVX: NOT DETECTED

## (undated) DEVICE — POSITIONER FOAM HEAD CRADLE (PINK)

## (undated) DEVICE — FOLEY TRAY 16FR 5CC LTX UMETER CLOSED

## (undated) DEVICE — DRSG MASTISOL

## (undated) DEVICE — XI OBTURATOR OPTICAL BLADELESS 8MM

## (undated) DEVICE — D HELP - CLEARVIEW CLEARIFY SYSTEM

## (undated) DEVICE — WARMING BLANKET UPPER ADULT

## (undated) DEVICE — POSITIONER PINK PAD PIGAZZI SYSTEM

## (undated) DEVICE — LAPSAC SURGICAL TISSUE POUCH 8X10"

## (undated) DEVICE — XI SEAL UNIVERSIAL 5-12MM

## (undated) DEVICE — TUBING STRYKEFLOW II SUCTION / IRRIGATOR

## (undated) DEVICE — UTERINE MANIPULATOR CONMED VCARE LG 37MM

## (undated) DEVICE — UTERINE MANIPULATOR CONMED VCARE MED 34MM

## (undated) DEVICE — XI TIP COVER

## (undated) DEVICE — VENODYNE/SCD SLEEVE CALF MEDIUM

## (undated) DEVICE — SUT POLYSORB 0 30" GS-23

## (undated) DEVICE — PACK D&C

## (undated) DEVICE — ELCTR BOVIE PENCIL SMOKE EVACUATION

## (undated) DEVICE — NDL HYPO REGULAR BEVEL 25G X 1.5" (BLUE)

## (undated) DEVICE — SUT MONOCRYL 4-0 27" PS-2 UNDYED

## (undated) DEVICE — POSITIONER PURPLE ARM ONE STEP (LARGE)

## (undated) DEVICE — LUBRICATING JELLY ONESHOT 1.25OZ

## (undated) DEVICE — TUBING IRR SET FOR CYSTOSCOPY 77"

## (undated) DEVICE — XI ARM NEEDLE DRIVER LARGE

## (undated) DEVICE — SUT POLYSORB 0 60" TIES UNDYED

## (undated) DEVICE — PACK ROBOTIC LIJ

## (undated) DEVICE — ENDOCATCH 10MM SPECIMEN POUCH

## (undated) DEVICE — GLV 6.5 PROTEXIS (BLUE)

## (undated) DEVICE — XI ARM FORCEP FENESTRATED BIPOLAR 8MM

## (undated) DEVICE — XI ARM FORCEP PROGRASP 8MM

## (undated) DEVICE — GOWN XL

## (undated) DEVICE — NDL COUNTER FOAM AND MAGNET 10-20

## (undated) DEVICE — PACK PERI GYN

## (undated) DEVICE — ELCTR GROUNDING PAD ADULT COVIDIEN

## (undated) DEVICE — UTERINE MANIPULATOR COOPER SURGICAL 5MM 33CM GREEN

## (undated) DEVICE — GLV 6 PROTEXIS (WHITE)

## (undated) DEVICE — UTERINE MANIPULATOR CONMED VCARE SM 32MM

## (undated) DEVICE — TUBING AIRSEAL TRI-LUMEN FILTERED

## (undated) DEVICE — XI DRAPE ARM

## (undated) DEVICE — SOL IRR POUR NS 0.9% 500ML

## (undated) DEVICE — PREP BETADINE KIT

## (undated) DEVICE — XI ARM SCISSOR MONO CURVED

## (undated) DEVICE — SUT VICRYL 0 27" UR-6

## (undated) DEVICE — DRAPE MAYO STAND 23"

## (undated) DEVICE — XI DRAPE COLUMN

## (undated) DEVICE — SYR ASEPTO

## (undated) DEVICE — XI ARM NEEDLE DRIVER SUTURECUT MEGA 8MM

## (undated) DEVICE — SPECIMEN CONTAINER 100ML